# Patient Record
Sex: FEMALE | Race: WHITE | NOT HISPANIC OR LATINO
[De-identification: names, ages, dates, MRNs, and addresses within clinical notes are randomized per-mention and may not be internally consistent; named-entity substitution may affect disease eponyms.]

---

## 2022-01-07 ENCOUNTER — TRANSCRIPTION ENCOUNTER (OUTPATIENT)
Age: 42
End: 2022-01-07

## 2022-05-05 ENCOUNTER — APPOINTMENT (OUTPATIENT)
Dept: ORTHOPEDIC SURGERY | Facility: CLINIC | Age: 42
End: 2022-05-05
Payer: COMMERCIAL

## 2022-05-05 DIAGNOSIS — J45.909 UNSPECIFIED ASTHMA, UNCOMPLICATED: ICD-10-CM

## 2022-05-05 PROCEDURE — 99214 OFFICE O/P EST MOD 30 MIN: CPT | Mod: 25

## 2022-05-05 PROCEDURE — L1902: CPT

## 2022-05-05 RX ORDER — VALACYCLOVIR HYDROCHLORIDE 500 MG/1
500 TABLET, FILM COATED ORAL
Refills: 0 | Status: ACTIVE | COMMUNITY

## 2022-05-05 RX ORDER — METHYLPHENIDATE HYDROCHLORIDE 54 MG/1
54 TABLET, EXTENDED RELEASE ORAL
Refills: 0 | Status: ACTIVE | COMMUNITY

## 2022-05-05 NOTE — PHYSICAL EXAM
[NL (40)] : plantar flexion 40 degrees [NL 30)] : inversion 30 degrees [NL (20)] : eversion 20 degrees [5___] : [facility] 5[unfilled]/5 [2+] : posterior tibialis pulse: 2+ [Normal] : saphenous nerve sensation normal [] : patient ambulates without assistive device

## 2022-05-05 NOTE — PHYSICAL EXAM
[NL (40)] : plantar flexion 40 degrees [NL 30)] : inversion 30 degrees [NL (20)] : eversion 20 degrees [5___] : [facility] 5[unfilled]/5 [2+] : posterior tibialis pulse: 2+ [Normal] : saphenous nerve sensation normal [] : patient ambulates without assistive device

## 2022-05-05 NOTE — HISTORY OF PRESENT ILLNESS
[Right Leg] : right leg [Gradual] : gradual [Sudden] : sudden [8] : 8 [6] : 6 [Burning] : burning [Shooting] : shooting [Stabbing] : stabbing [Intermittent] : intermittent [Standing] : standing [Walking] : walking [Exercising] : exercising [Stairs] : stairs [Full time] : Work status: full time [FreeTextEntry3] : 4/19/22 [FreeTextEntry5] : Pt was running and felt a sudden onset of pain and swelling right after the ran one day, pt was first seen by dr cotton and was given HEP with no relief and pain got worse over time and pt went to another ortho urgent care, pt had an mri done and pt was given a boot and pt is now following up with us  [de-identified] : xrays MRIs  [de-identified] : None  [de-identified] :   [de-identified] : Pt is a 41 year old F who presents today for evaluation of their right foot. Pt states that\par Pain localized along the medial lateral dorsal plantar\par Had XR taken which showed\par Denies trauma/previous injury\par No N/T.\par Ice to affected area.\par No formal treatment to date. \par WB in\par  [] : Post Surgical Visit: no [FreeTextEntry1] : R foot

## 2022-05-05 NOTE — ASSESSMENT
[FreeTextEntry1] : She may d/c cam boot - neg for stress fx\par Airlift heel support\par She can do PT and stretching.\par Accessory navicular is not clincially evident at this time.\par \par The patient was explained the options as well as benefits of over the counter verses prescription strength nonsteroidal anti-inflammatory medication. The patient opts for a prescription strength medication.\par

## 2022-05-05 NOTE — DATA REVIEWED
[MRI] : MRI [Right] : of the right [Ankle] : ankle [I reviewed the films/CD] : I reviewed the films/CD [FreeTextEntry1] : IMPRESSION:\par (ZP 5/4/22) No stress fracture. Normal first metatarsal.\par Partially fused type II os navicularis with bone marrow edema across the central\par joint.\par \par Small amount of fluid at the flexor hallucis longus last flexor digitorum longus\par intersection which may be tenosynovitis.\par Edema and thickening of the long plantar ligament which likely represents a\par sprain.\par Diffuse soft tissue edema superficial to the quadratus plantar muscle.

## 2022-05-05 NOTE — HISTORY OF PRESENT ILLNESS
[Right Leg] : right leg [Gradual] : gradual [Sudden] : sudden [8] : 8 [6] : 6 [Burning] : burning [Shooting] : shooting [Stabbing] : stabbing [Intermittent] : intermittent [Standing] : standing [Walking] : walking [Exercising] : exercising [Stairs] : stairs [Full time] : Work status: full time [FreeTextEntry3] : 4/19/22 [FreeTextEntry5] : Pt was running and felt a sudden onset of pain and swelling right after the ran one day, pt was first seen by dr cotton and was given HEP with no relief and pain got worse over time and pt went to another ortho urgent care, pt had an mri done and pt was given a boot and pt is now following up with us  [de-identified] : xrays MRIs  [de-identified] : None  [de-identified] :   [de-identified] : Pt is a 41 year old F who presents today for evaluation of their right foot. Pt states that\par Pain localized along the medial lateral dorsal plantar\par Had XR taken which showed\par Denies trauma/previous injury\par No N/T.\par Ice to affected area.\par No formal treatment to date. \par WB in\par  [] : Post Surgical Visit: no [FreeTextEntry1] : R foot

## 2022-06-02 ENCOUNTER — APPOINTMENT (OUTPATIENT)
Dept: ORTHOPEDIC SURGERY | Facility: CLINIC | Age: 42
End: 2022-06-02

## 2022-06-08 ENCOUNTER — APPOINTMENT (OUTPATIENT)
Dept: ORTHOPEDIC SURGERY | Facility: CLINIC | Age: 42
End: 2022-06-08
Payer: COMMERCIAL

## 2022-06-08 VITALS — BODY MASS INDEX: 30.2 KG/M2 | HEIGHT: 63.5 IN | WEIGHT: 172.6 LBS

## 2022-06-08 PROCEDURE — 73630 X-RAY EXAM OF FOOT: CPT | Mod: RT

## 2022-06-08 PROCEDURE — 99214 OFFICE O/P EST MOD 30 MIN: CPT

## 2022-06-08 NOTE — REVIEW OF SYSTEMS
[Joint Pain] : joint pain [Joint Stiffness] : joint stiffness [Joint Swelling] : joint swelling [Feeling Tired] : fatigue [Muscle Weakness] : muscle weakness [Negative] : Heme/Lymph

## 2022-06-14 ENCOUNTER — NON-APPOINTMENT (OUTPATIENT)
Age: 42
End: 2022-06-14

## 2022-06-22 ENCOUNTER — LABORATORY RESULT (OUTPATIENT)
Age: 42
End: 2022-06-22

## 2022-06-27 ENCOUNTER — APPOINTMENT (OUTPATIENT)
Age: 42
End: 2022-06-27

## 2022-06-27 PROCEDURE — 29515 APPLICATION SHORT LEG SPLINT: CPT | Mod: 59,RT

## 2022-06-27 PROCEDURE — 28238 REVISION OF FOOT TENDON: CPT | Mod: RT

## 2022-06-30 NOTE — DISCUSSION/SUMMARY
[Surgical risks reviewed] : Surgical risks reviewed [de-identified] : The risks, benefits, alternatives have been discussed.  The risks include but are not limited to infection, bleeding, injury to small nerves and blood vessels, pain, stiffness, progression, dvt, PE, amputation and death.

## 2022-06-30 NOTE — HISTORY OF PRESENT ILLNESS
[Gradual] : gradual [Sudden] : sudden [3] : 3 [Burning] : burning [Radiating] : radiating [Shooting] : shooting [Tingling] : tingling [Physical therapy] : physical therapy [Standing] : standing [Walking] : walking [Stairs] : stairs [Full time] : Work status: full time [de-identified] : 5/5/22: Pt is a 41 year old F who presents today for evaluation of their right foot. Pt states that\par Pain localized along the medial lateral dorsal plantar. Was seen at  and xrays done\par \par 6/8/22: Here for a second opinion for right foot/ankle pain. Pain started 4/29/22 after running. She was treated in a cam boot for 4 days. She saw Dr. Santos and was treated in [facility] brace without relief. Pain started in her heel but is now in the inside of her ankle. She also wears orthotics. She had an mri done at [facility]. \par  [] : no [FreeTextEntry3] : 4-19-22 [FreeTextEntry5] : pt went for a jog and  had pain in ankle, different than has had in the past [FreeTextEntry7] : inner ankle [FreeTextEntry9] : PT TIW for the past month [de-identified] : running [de-identified] : Dr. Santos [de-identified] : has had xr and MRI done [de-identified] :

## 2022-06-30 NOTE — DATA REVIEWED
[MRI] : MRI [Right] : of the right [Report was reviewed and noted in the chart] : The report was reviewed and noted in the chart [FreeTextEntry1] : joao  5/3/22 No stress fracture. Normal first metatarsal.\par \par Partially fused type II os navicularis with bone marrow edema across the central\par joint.\par \par Small amount of fluid at the flexor hallucis longus last flexor digitorum longus\par intersection which may be tenosynovitis.\par \par Edema and thickening of the long plantar ligament which likely represents a\par sprain.\par \par Diffuse soft tissue edema superficial to the quadratus plantar muscle.

## 2022-06-30 NOTE — PHYSICAL EXAM
[Right] : right foot and ankle [NL (20)] : dorsiflexion 20 degrees [NL (40)] : plantar flexion 40 degrees [Positive Tinel sign at _____] : Positive Tinel sign at [unfilled] [] : no calf tenderness [de-identified] : posterior tibialis [de-identified] : sl decreaszed 1/2 toes

## 2022-06-30 NOTE — IMAGING
[Weight -] : weightbearing [There are no fractures, subluxations or dislocations. No significant abnormalities are seen] : There are no fractures, subluxations or dislocations. No significant abnormalities are seen [de-identified] : Reverse oblique:  large accessory navicula

## 2022-07-06 ENCOUNTER — APPOINTMENT (OUTPATIENT)
Dept: ORTHOPEDIC SURGERY | Facility: CLINIC | Age: 42
End: 2022-07-06
Payer: COMMERCIAL

## 2022-07-06 VITALS — BODY MASS INDEX: 30.1 KG/M2 | WEIGHT: 172 LBS | HEIGHT: 63.5 IN

## 2022-07-06 PROCEDURE — 73610 X-RAY EXAM OF ANKLE: CPT | Mod: 1L,RT

## 2022-07-06 PROCEDURE — 73630 X-RAY EXAM OF FOOT: CPT | Mod: RT

## 2022-07-06 PROCEDURE — 99024 POSTOP FOLLOW-UP VISIT: CPT

## 2022-07-06 NOTE — DISCUSSION/SUMMARY
attempt to call report, rn to call back [de-identified] : [facility] nwb\par \par Xarelto approval

## 2022-07-06 NOTE — DATA REVIEWED
[Report was reviewed and noted in the chart] : The report was reviewed and noted in the chart [FreeTextEntry1] : \par \par Partially fused type II os navicularis with bone marrow edema across the central\par joint.\par \par Small amount of fluid at the flexor hallucis longus last flexor digitorum longus\par intersection which may be tenosynovitis.\par \par Edema and thickening of the long plantar ligament which likely represents a\par sprain.\par \par Diffuse soft tissue edema superficial to the quadratus plantar muscle.

## 2022-07-06 NOTE — HISTORY OF PRESENT ILLNESS
[Gradual] : gradual [Sudden] : sudden [3] : 3 [Burning] : burning [Radiating] : radiating [Shooting] : shooting [Tingling] : tingling [Physical therapy] : physical therapy [Standing] : standing [Walking] : walking [Stairs] : stairs [Full time] : Work status: full time [de-identified] : 5/5/22: Pt is a 41 year old F who presents today for evaluation of their right foot. Pt states that\par Pain localized along the medial lateral dorsal plantar. Was seen at  and xrays done\par \par 6/8/22: Here for a second opinion for right foot/ankle pain. Pain started 4/29/22 after running. She was treated in a cam boot for 4 days. She saw Dr. Santos and was treated in [facility] brace without relief. Pain started in her heel but is now in the inside of her ankle. She also wears orthotics. She had an mri done at [facility]. \par \par 6/27/22  R Kidner procedure\par \par 7/6/22  f/u R leg, nwni in splint  Xarelto pending\par  [] : no [FreeTextEntry3] : 4-19-22 [FreeTextEntry5] : pt went for a jog and  had pain in ankle, different than has had in the past [FreeTextEntry7] : inner ankle [de-identified] : running [FreeTextEntry9] : PT TIW for the past month [de-identified] : Dr. Santos [de-identified] : has had xr and MRI done [de-identified] :

## 2022-07-06 NOTE — PHYSICAL EXAM
[Right] : right foot and ankle [NL (20)] : dorsiflexion 20 degrees [NL (40)] : plantar flexion 40 degrees [Positive Tinel sign at _____] : Positive Tinel sign at [unfilled] [] : no calf tenderness [de-identified] : posterior tibialis [de-identified] : sl decreaszed 1/2 toes

## 2022-07-06 NOTE — IMAGING
[There are no fractures, subluxations or dislocations. No significant abnormalities are seen] : There are no fractures, subluxations or dislocations. No significant abnormalities are seen [Weight -] : weightbearing [Right] : right foot [No loss of surgical correlation. Bony alignment acceptable. Hardware in appropriate position] : No loss of surgical correlation. Bony alignment acceptable. Hardware in appropriate position [de-identified] : Reverse oblique:  s/p Kidner intact dia

## 2022-07-08 NOTE — REVIEW OF SYSTEMS
[Joint Pain] : joint pain [Joint Stiffness] : joint stiffness [Joint Swelling] : joint swelling [Feeling Tired] : fatigue [Muscle Weakness] : muscle weakness [Negative] : Heme/Lymph [FreeTextEntry9] : rt foot

## 2022-07-08 NOTE — PHYSICAL EXAM
[Right] : right foot and ankle [NL (20)] : dorsiflexion 20 degrees [NL (40)] : plantar flexion 40 degrees [Positive Tinel sign at _____] : Positive Tinel sign at [unfilled] [] : no calf tenderness [de-identified] : posterior tibialis [de-identified] : sl decreaszed 1/2 toes

## 2022-07-08 NOTE — IMAGING
[Weight -] : weightbearing [There are no fractures, subluxations or dislocations. No significant abnormalities are seen] : There are no fractures, subluxations or dislocations. No significant abnormalities are seen [de-identified] : Reverse oblique:  large accessory navicula

## 2022-07-08 NOTE — HISTORY OF PRESENT ILLNESS
Will discuss with patient/ son Brennon tomorrow. Will need fax or specific pharmacy name to transfer script to.   [Gradual] : gradual [Sudden] : sudden [3] : 3 [Burning] : burning [Radiating] : radiating [Shooting] : shooting [Tingling] : tingling [Physical therapy] : physical therapy [Standing] : standing [Walking] : walking [Stairs] : stairs [Full time] : Work status: full time [de-identified] : 5/5/22: Pt is a 41 year old F who presents today for evaluation of their right foot. Pt states that\par Pain localized along the medial lateral dorsal plantar. Was seen at  and xrays done\par \par 6/8/22: Here for a second opinion for right foot/ankle pain. Pain started 4/29/22 after running. She was treated in a cam boot for 4 days. She saw Dr. Santos and was treated in [facility] brace without relief. Pain started in her heel but is now in the inside of her ankle. She also wears orthotics. She had an mri done at [facility]. \par \par 7/1/22  R Kidner procedure\par \par 7/6/22 f/u R foot  NWB in splint  Xarelto not auth [] : no [FreeTextEntry3] : 4-19-22 [FreeTextEntry5] : pt went for a jog and  had pain in ankle, different than has had in the past [FreeTextEntry7] : inner ankle [FreeTextEntry9] : PT TIW for the past month [de-identified] : running [de-identified] : Dr. Santos [de-identified] : has had xr and MRI done [de-identified] :  [de-identified] : 6 [de-identified] : rt [place]

## 2022-08-03 ENCOUNTER — APPOINTMENT (OUTPATIENT)
Dept: ORTHOPEDIC SURGERY | Facility: CLINIC | Age: 42
End: 2022-08-03

## 2022-08-03 VITALS — BODY MASS INDEX: 30.1 KG/M2 | WEIGHT: 172 LBS | HEIGHT: 63.5 IN

## 2022-08-03 DIAGNOSIS — Z86.718 PERSONAL HISTORY OF OTHER VENOUS THROMBOSIS AND EMBOLISM: ICD-10-CM

## 2022-08-03 PROCEDURE — 99024 POSTOP FOLLOW-UP VISIT: CPT

## 2022-08-03 NOTE — PHYSICAL EXAM
[Right] : right foot and ankle [Positive Tinel sign at _____] : Positive Tinel sign at [unfilled] [2+] : dorsalis pedis pulse: 2+ [] : no navicular tuberosity tenderness [de-identified] : posterior tibialis [de-identified] : improving sensation

## 2022-08-03 NOTE — HISTORY OF PRESENT ILLNESS
[Gradual] : gradual [Sudden] : sudden [4] : 4 [Burning] : burning [Localized] : localized [Shooting] : shooting [Tingling] : tingling [Physical therapy] : physical therapy [Standing] : standing [Walking] : walking [Stairs] : stairs [Full time] : Work status: full time [de-identified] : 5/5/22: Pt is a 41 year old F who presents today for evaluation of their right foot. Pt states that\par Pain localized along the medial lateral dorsal plantar. Was seen at  and xrays done\par \par 6/8/22: Here for a second opinion for right foot/ankle pain. Pain started 4/29/22 after running. She was treated in a cam boot for 4 days. She saw Dr. Santos and was treated in [facility] brace without relief. Pain started in her heel but is now in the inside of her ankle. She also wears orthotics. She had an mri done at [facility]. \par \par 6/27/22  R Kidner procedure\par \par 7/6/22  f/u R leg, nwb in splint  Xarelto pending\par 8/3/22 f/u R leg nwb in slc\par  [] : no [FreeTextEntry1] : rt foot [FreeTextEntry3] : 4-19-22 [FreeTextEntry5] : pt went for a jog and  had pain in ankle, different than has had in the past [de-identified] : running [de-identified] : Dr. Santos [de-identified] : 6-27-22 [de-identified] : has had xr and MRI done [de-identified] :  [de-identified] : from home [de-identified] : 6-27-22

## 2022-08-03 NOTE — REVIEW OF SYSTEMS
[Joint Pain] : joint pain [Joint Stiffness] : joint stiffness [Joint Swelling] : joint swelling [Muscle Weakness] : muscle weakness [Negative] : Heme/Lymph [Feeling Tired] : no fatigue [FreeTextEntry9] : feels pulling at sx site

## 2022-09-07 ENCOUNTER — APPOINTMENT (OUTPATIENT)
Dept: ORTHOPEDIC SURGERY | Facility: CLINIC | Age: 42
End: 2022-09-07

## 2022-09-07 VITALS — HEIGHT: 63.5 IN | WEIGHT: 172 LBS | BODY MASS INDEX: 30.1 KG/M2

## 2022-09-07 PROCEDURE — 73630 X-RAY EXAM OF FOOT: CPT | Mod: LT

## 2022-09-07 NOTE — REVIEW OF SYSTEMS
[Joint Pain] : joint pain [Joint Stiffness] : joint stiffness [Joint Swelling] : joint swelling [Muscle Weakness] : muscle weakness [Negative] : Heme/Lymph [Feeling Tired] : no fatigue [FreeTextEntry9] : soreness at sx site

## 2022-09-07 NOTE — HISTORY OF PRESENT ILLNESS
[Gradual] : gradual [Sudden] : sudden [2] : 2 [1] : 2 [Burning] : burning [Localized] : localized [Tingling] : tingling [Physical therapy] : physical therapy [Walking] : walking [Full time] : Work status: full time [de-identified] : 5/5/22: Pt is a 41 year old F who presents today for evaluation of their right foot. Pt states that\par Pain localized along the medial lateral dorsal plantar. Was seen at  and xrays done\par \par 6/8/22: Here for a second opinion for right foot/ankle pain. Pain started 4/29/22 after running. She was treated in a cam boot for 4 days. She saw Dr. Santos and was treated in [facility] brace without relief. Pain started in her heel but is now in the inside of her ankle. She also wears orthotics. She had an mri done at [facility]. \par \par 6/27/22  R Kidner procedure\par \par 7/6/22  f/u R leg, nwb in splint  Xarelto pending\par 8/3/22 f/u R leg nwb in slc\par 9/7/22  f/u R foot  HEP/PT   Considering L foot sugery 11/2022\par  [] : no [FreeTextEntry1] : rt foot [FreeTextEntry3] : 4-19-22 [FreeTextEntry5] : pt went for a jog and  had pain in ankle, different than has had in the past [de-identified] : 6-27-22 [de-identified] :  [de-identified] : from home [de-identified] : 6-27-22

## 2022-09-07 NOTE — PHYSICAL EXAM
[Right] : right foot and ankle [Positive Tinel sign at _____] : Positive Tinel sign at [unfilled] [2+] : dorsalis pedis pulse: 2+ [] : no navicular tuberosity tenderness [de-identified] : balance [de-identified] : improving sensation [TWNoteComboBox7] : dorsiflexion 10 degrees

## 2022-09-13 ENCOUNTER — RESULT REVIEW (OUTPATIENT)
Age: 42
End: 2022-09-13

## 2022-10-19 ENCOUNTER — APPOINTMENT (OUTPATIENT)
Dept: ORTHOPEDIC SURGERY | Facility: CLINIC | Age: 42
End: 2022-10-19

## 2022-10-19 VITALS — BODY MASS INDEX: 30.1 KG/M2 | HEIGHT: 63.5 IN | WEIGHT: 172 LBS

## 2022-10-19 PROCEDURE — 99214 OFFICE O/P EST MOD 30 MIN: CPT

## 2022-10-19 NOTE — HISTORY OF PRESENT ILLNESS
[2] : 2 [1] : 2 [Localized] : localized [Physical therapy] : physical therapy [Full time] : Work status: full time [Shooting] : shooting [Tightness] : tightness [de-identified] : 5/5/22: Pt is a 41 year old F who presents today for evaluation of their right foot. Pt states that\par Pain localized along the medial lateral dorsal plantar. Was seen at  and xrays done\par \par 6/8/22: Here for a second opinion for right foot/ankle pain. Pain started 4/29/22 after running. She was treated in a cam boot for 4 days. She saw Dr. Santos and was treated in [facility] brace without relief. Pain started in her heel but is now in the inside of her ankle. She also wears orthotics. She had an mri done at [facility]. \par \par 6/27/22  R Kidner procedure\par \par 7/6/22  f/u R leg, nwb in splint  Xarelto pending\par 8/3/22 f/u R leg nwb in slc\par 9/7/22  f/u R foot  HEP/PT   Considering L foot sugery 11/2022\par 10/19/22  Pre-op L leg\par  [] : Post Surgical Visit: no [FreeTextEntry1] : rt foot [FreeTextEntry5] : worse since COVID/Flu vaccine 10-14-22 [de-identified] : 6-27-22 [de-identified] :  [de-identified] : from home [de-identified] : 6-27-22

## 2022-10-19 NOTE — PHYSICAL EXAM
[Right] : right foot and ankle [Positive Tinel sign at _____] : Positive Tinel sign at [unfilled] [2+] : dorsalis pedis pulse: 2+ [] : no navicular tuberosity tenderness [de-identified] : balance [de-identified] : improving sensation [TWNoteComboBox7] : dorsiflexion 10 degrees

## 2022-10-19 NOTE — DISCUSSION/SUMMARY
[Surgical risks reviewed] : Surgical risks reviewed [de-identified] : The risks, benefits, alternatives have been discussed.  The risks include but are not limited to infection, bleeding, injury to small nerves and blood vessels, pain, stiffness, progression, dvt, PE, amputation and death.\par \par

## 2022-10-19 NOTE — DATA REVIEWED
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  Type II accessory navicular bone.  Degenerative changes at the tibiotalar joint.

## 2022-10-26 ENCOUNTER — FORM ENCOUNTER (OUTPATIENT)
Age: 42
End: 2022-10-26

## 2022-10-31 ENCOUNTER — LABORATORY RESULT (OUTPATIENT)
Age: 42
End: 2022-10-31

## 2022-11-04 ENCOUNTER — APPOINTMENT (OUTPATIENT)
Age: 42
End: 2022-11-04

## 2022-11-04 PROCEDURE — 28238 REVISION OF FOOT TENDON: CPT | Mod: LT

## 2022-11-04 PROCEDURE — 29515 APPLICATION SHORT LEG SPLINT: CPT | Mod: 59,LT

## 2022-11-09 ENCOUNTER — APPOINTMENT (OUTPATIENT)
Dept: ORTHOPEDIC SURGERY | Facility: CLINIC | Age: 42
End: 2022-11-09
Payer: COMMERCIAL

## 2022-11-09 VITALS — BODY MASS INDEX: 30.1 KG/M2 | WEIGHT: 172 LBS | HEIGHT: 63.5 IN

## 2022-11-09 PROCEDURE — 29405 APPL SHORT LEG CAST: CPT | Mod: 58

## 2022-11-09 PROCEDURE — 99024 POSTOP FOLLOW-UP VISIT: CPT

## 2022-11-09 PROCEDURE — 73630 X-RAY EXAM OF FOOT: CPT | Mod: LT

## 2022-11-09 NOTE — PHYSICAL EXAM
[2+] : dorsalis pedis pulse: 2+ [Left] : left foot and ankle [] : no calf tenderness [TWNoteComboBox7] : dorsiflexion 10 degrees

## 2022-11-09 NOTE — HISTORY OF PRESENT ILLNESS
[6] : 6 [5] : 5 [Localized] : localized [Throbbing] : throbbing [Tightness] : tightness [Frequent] : frequent [Full time] : Work status: full time [de-identified] : 5/5/22: Pt is a 41 year old F who presents today for evaluation of their right foot. Pt states that\par Pain localized along the medial lateral dorsal plantar. Was seen at  and xrays done\par \par 6/8/22: Here for a second opinion for right foot/ankle pain. Pain started 4/29/22 after running. She was treated in a cam boot for 4 days. She saw Dr. Santos and was treated in [facility] brace without relief. Pain started in her heel but is now in the inside of her ankle. She also wears orthotics. She had an mri done at [facility]. \par \par 6/27/22  R Kidner procedure\par \par 7/6/22  f/u R leg, nwb in splint  Xarelto pending\par 8/3/22 f/u R leg nwb in slc\par 9/7/22  f/u R foot  HEP/PT   Considering L foot sugery 11/2022\par 10/19/22  Pre-op L leg\par \par 11/4/22L Kidner\par \par 11/9/22 f/u L leg, nwb in splint  Xarelto\par \par  [] : Post Surgical Visit: no [FreeTextEntry1] : left foot [FreeTextEntry6] : itchy, bruising, spasms [de-identified] : elevation at times [de-identified] : 11-4-22 [de-identified] :  [de-identified] : from home [de-identified] : 11-4-22

## 2022-11-09 NOTE — REVIEW OF SYSTEMS
[Joint Pain] : joint pain [Muscle Weakness] : muscle weakness [Negative] : Heme/Lymph [Joint Stiffness] : no joint stiffness [Joint Swelling] : no joint swelling [Feeling Tired] : no fatigue

## 2022-11-09 NOTE — DISCUSSION/SUMMARY
[de-identified] : A well padded cast was applied.  Patient was instructed on proper cast management including keeping it dry and not sticking anything down the cast.  Patient was told that if pain significantly increases or toes turn numb and/or blue and simple elevation does not allow symptoms to improve in a few minutes, to call the office immediately or go to the ER.  All questions were answered.\par \par \par Cont Xarelto\par \par

## 2022-11-16 ENCOUNTER — APPOINTMENT (OUTPATIENT)
Dept: ORTHOPEDIC SURGERY | Facility: CLINIC | Age: 42
End: 2022-11-16

## 2022-12-14 ENCOUNTER — APPOINTMENT (OUTPATIENT)
Dept: ORTHOPEDIC SURGERY | Facility: CLINIC | Age: 42
End: 2022-12-14

## 2022-12-14 PROCEDURE — 99024 POSTOP FOLLOW-UP VISIT: CPT

## 2022-12-14 NOTE — HISTORY OF PRESENT ILLNESS
[5] : 5 [Throbbing] : throbbing [Tightness] : tightness [Full time] : Work status: full time [2] : 2 [Radiating] : radiating [Occasional] : occasional [Standing] : standing [Walking] : walking [de-identified] : 5/5/22: Pt is a 41 year old F who presents today for evaluation of their right foot. Pt states that\par Pain localized along the medial lateral dorsal plantar. Was seen at  and xrays done\par \par 6/8/22: Here for a second opinion for right foot/ankle pain. Pain started 4/29/22 after running. She was treated in a cam boot for 4 days. She saw Dr. Santos and was treated in [facility] brace without relief. Pain started in her heel but is now in the inside of her ankle. She also wears orthotics. She had an mri done at [facility]. \par \par 6/27/22  R Kidner procedure\par \par 7/6/22  f/u R leg, nwb in splint  Xarelto pending\par 8/3/22 f/u R leg nwb in slc\par 9/7/22  f/u R foot  HEP/PT   Considering L foot sugery 11/2022\par 10/19/22  Pre-op L leg\par \par 11/4/22L Kidner\par \par 11/9/22 f/u L leg, nwb in splint  Xarelto\par 12/14/22  f/u L leg nwb in slc\par \par  [] : no [FreeTextEntry1] : left foot [FreeTextEntry6] : Opposite foot has developed pain due to over favoring it  [FreeTextEntry7] : foot to the knee [FreeTextEntry9] : Elevation [de-identified] : 11-4-22 [de-identified] :  [de-identified] : from home [de-identified] : 11-4-22

## 2022-12-14 NOTE — PHYSICAL EXAM
[Left] : left foot and ankle [2+] : dorsalis pedis pulse: 2+ [] : no calf tenderness [TWNoteComboBox7] : dorsiflexion 10 degrees

## 2022-12-19 ENCOUNTER — NON-APPOINTMENT (OUTPATIENT)
Age: 42
End: 2022-12-19

## 2023-01-11 ENCOUNTER — APPOINTMENT (OUTPATIENT)
Dept: ORTHOPEDIC SURGERY | Facility: CLINIC | Age: 43
End: 2023-01-11
Payer: COMMERCIAL

## 2023-01-11 VITALS — WEIGHT: 172 LBS | BODY MASS INDEX: 30.1 KG/M2 | HEIGHT: 63.5 IN

## 2023-01-11 PROCEDURE — 99024 POSTOP FOLLOW-UP VISIT: CPT

## 2023-01-11 NOTE — DISCUSSION/SUMMARY
[de-identified] : d/c bbbot\par Shoe/insoles\par PT [facility]\par \par MRI to evaluate plantar fascia\par \par

## 2023-01-11 NOTE — REVIEW OF SYSTEMS
[Joint Pain] : joint pain [Muscle Weakness] : muscle weakness [Negative] : Heme/Lymph [Joint Stiffness] : joint stiffness [Joint Swelling] : no joint swelling [Feeling Tired] : no fatigue [FreeTextEntry9] : rt toe stiffness and left ankle

## 2023-01-11 NOTE — HISTORY OF PRESENT ILLNESS
[Radiating] : radiating [Occasional] : occasional [Standing] : standing [Walking] : walking [Full time] : Work status: full time [4] : 4 [0] : 0 [Shooting] : shooting [Leisure] : leisure [Physical therapy] : physical therapy [de-identified] : 5/5/22: Pt is a 41 year old F who presents today for evaluation of their right foot. Pt states that\par Pain localized along the medial lateral dorsal plantar. Was seen at  and xrays done\par \par 6/8/22: Here for a second opinion for right foot/ankle pain. Pain started 4/29/22 after running. She was treated in a cam boot for 4 days. She saw Dr. Santos and was treated in [facility] brace without relief. Pain started in her heel but is now in the inside of her ankle. She also wears orthotics. She had an mri done at [facility]. \par \par 6/27/22  R Kidner procedure\par \par 7/6/22  f/u R leg, nwb in splint  Xarelto pending\par 8/3/22 f/u R leg nwb in slc\par 9/7/22  f/u R foot  HEP/PT   Considering L foot sugery 11/2022\par 10/19/22  Pre-op L leg\par \par 11/4/22L Kidner\par \par 11/9/22 f/u L leg, nwb in splint  Xarelto\par 12/14/22  f/u L leg nwb in slc\par 1/11/23 f/u L foot.  WB in cast w/arch support  Started PT  Concerned about plantar fascia\par  [] : no [FreeTextEntry1] : left foot [FreeTextEntry7] : foot to the knee [FreeTextEntry9] : Elevation [de-identified] : 11-4-22 [de-identified] :  [de-identified] : from home [de-identified] : 11-4-22

## 2023-01-23 ENCOUNTER — APPOINTMENT (OUTPATIENT)
Dept: CARDIOLOGY | Facility: CLINIC | Age: 43
End: 2023-01-23
Payer: COMMERCIAL

## 2023-01-23 ENCOUNTER — NON-APPOINTMENT (OUTPATIENT)
Age: 43
End: 2023-01-23

## 2023-01-23 VITALS
HEART RATE: 87 BPM | OXYGEN SATURATION: 97 % | HEIGHT: 63.5 IN | DIASTOLIC BLOOD PRESSURE: 88 MMHG | SYSTOLIC BLOOD PRESSURE: 130 MMHG | BODY MASS INDEX: 30.8 KG/M2 | WEIGHT: 176 LBS

## 2023-01-23 DIAGNOSIS — Z78.9 OTHER SPECIFIED HEALTH STATUS: ICD-10-CM

## 2023-01-23 DIAGNOSIS — Z82.49 FAMILY HISTORY OF ISCHEMIC HEART DISEASE AND OTHER DISEASES OF THE CIRCULATORY SYSTEM: ICD-10-CM

## 2023-01-23 PROCEDURE — 93000 ELECTROCARDIOGRAM COMPLETE: CPT

## 2023-01-23 PROCEDURE — 99204 OFFICE O/P NEW MOD 45 MIN: CPT | Mod: 25

## 2023-01-23 NOTE — PHYSICAL EXAM
[Normal] : normocephalic [] : Yes [Total Score ___] : Total Score = [unfilled] [Tremor] : no tremor [de-identified] : thin skin, hypermobile skin, thin scars , velvet skin  [Right] : Right: N [Left] : Left: N

## 2023-01-23 NOTE — HISTORY OF PRESENT ILLNESS
[FreeTextEntry1] : ROBERTO CARLOS CARCAMO is a 43 yo F PMH autoimmune disorder, arthritis, hx of DVT\par there is a concern for EDS \par  she underwent genetic testing with the [employer] test, that was negative\par \par she has had a clinical eval for EDS prior \par today she is referred for a cardiogenomic evaluation

## 2023-01-23 NOTE — REASON FOR VISIT
[FreeTextEntry3] : Dear Dr. Retana       . I saw your patient ROBERTO CARLOS CARCAMO on 01/23/2023 . Please see the note below for the assessment and plan. \par \par ROBERTO CARLOS CARCAMO  was seen  for an initial consultation at the Cardiogenomics Program at [facility] on 01/23/2023.   Ms. CARCAMO was referred by  Dr Retana for hereditary cardiac predisposition risk assessment and counseling, due to concern for EDS\par \par \par

## 2023-01-23 NOTE — FAMILY HISTORY
[FreeTextEntry1] : daughter has a cliical diagnosis of EDS \par FamilyHistory_20_twCiteListControlStart FamilyHistory_20_twCiteListControlEnd [id number] \par A four-generation family history was constructed and scanned into [email or web address]. \par Family history is significant for: \par siblings\par  43 yo sister hx afib: no children\par \par 34 yo half brother shared mother hx Aspbergers: infant daughter\par 31 yo half sister shared mother hx anxiety, asthma: no children due to fibroids, webbed toes, sensitive skin \par \par Mother 64 yo hx hypersensitivity, arthritis, hypermobile younger, varicose veins, hx obesity\par Maternal aunts x2 \par 68 yo gerdm keg pain debilitating\par Maternal uncles x2 \par uncle dec suicide , gout, crooked pinkie fingers, hyperhydrosis , depression\par  71 yo uncle hip replacement due to RA, CVA at birth\par Maternal Grandmother  dec 90yo  hx afib and htn , DM, arthritis , varicose veins, hypothyroid, AD\par Maternal Grandfather hx AAA dec 88 yo , renal failure CVA in 50-60s, atherosclerosis\par mat gr uncle hx AAA dec 91 yo \par  42 yo cousin hx AAA, from aunt , hx valve defect and gout\par \par Father 64 yo hx heart issues, depression , gi issues, "hole in heart at birth"\par Paternal aunts x2\par aunt dec 50s heart failure\par aunt 62 yo heart prob, vasc prob, afib, GI prob, HTN\par Paternal uncles x1 dec MI \par Paternal Grandfather hx heart prob, hx poilio as child dec\par Paternal Grandmother dec 56 yo BR CA\par \par children:\par 15 yo daughter, clinical diagnosis hypermobile EDS\par \par her maternal families originate from [place] and paternal families originate from [place], [place], [place]. \par No Ashkenazi Voodoo ancestry. \par Family history was positive/ negative for consanguinity  \par No family history of SIDS\par  \par \par  [FreeTextEntry2] : [place]  [FreeTextEntry3] : [place], [place], [place].

## 2023-01-24 ENCOUNTER — RESULT REVIEW (OUTPATIENT)
Age: 43
End: 2023-01-24

## 2023-01-25 ENCOUNTER — TRANSCRIPTION ENCOUNTER (OUTPATIENT)
Age: 43
End: 2023-01-25

## 2023-02-01 ENCOUNTER — APPOINTMENT (OUTPATIENT)
Dept: ORTHOPEDIC SURGERY | Facility: CLINIC | Age: 43
End: 2023-02-01
Payer: COMMERCIAL

## 2023-02-01 VITALS — HEIGHT: 63.5 IN | WEIGHT: 176 LBS | BODY MASS INDEX: 30.8 KG/M2

## 2023-02-01 PROCEDURE — 99024 POSTOP FOLLOW-UP VISIT: CPT

## 2023-02-01 NOTE — DATA REVIEWED
[MRI] : MRI [Left] : left [I independently reviewed and interpreted images and report] : I independently reviewed and interpreted images and report [FreeTextEntry1] : p-surgical changes

## 2023-02-01 NOTE — HISTORY OF PRESENT ILLNESS
[8] : 8 [1] : 2 [Radiating] : radiating [Shooting] : shooting [Occasional] : occasional [Leisure] : leisure [Physical therapy] : physical therapy [Standing] : standing [Walking] : walking [Full time] : Work status: full time [de-identified] : 5/5/22: Pt is a 41 year old F who presents today for evaluation of their right foot. Pt states that\par Pain localized along the medial lateral dorsal plantar. Was seen at  and xrays done\par \par 6/8/22: Here for a second opinion for right foot/ankle pain. Pain started 4/29/22 after running. She was treated in a cam boot for 4 days. She saw Dr. Santos and was treated in [facility] brace without relief. Pain started in her heel but is now in the inside of her ankle. She also wears orthotics. She had an mri done at [facility]. \par \par 6/27/22  R Kidner procedure\par \par 7/6/22  f/u R leg, nwb in splint  Xarelto pending\par 8/3/22 f/u R leg nwb in slc\par 9/7/22  f/u R foot  HEP/PT   Considering L foot sugery 11/2022\par 10/19/22  Pre-op L leg\par \par 11/4/22L Kidner\par \par 11/9/22 f/u L leg, nwb in splint  Xarelto\par 12/14/22  f/u L leg nwb in slc\par 1/11/23 f/u L foot.  WB in cast w/arch support  Started PT  Concerned about plantar fascia\par 2/1/23 f/u L foot  random pain and swelling and disloration  Improves with elevation.  Also w/ 1 mtp pain [] : no [FreeTextEntry1] : left foot [FreeTextEntry7] : foot to the knee [FreeTextEntry9] : Elevation [de-identified] : 11-4-22 [de-identified] : MRI  Aggie [de-identified] : back in short cam boot [de-identified] :  [de-identified] : from home [de-identified] : 11-4-22

## 2023-02-01 NOTE — REVIEW OF SYSTEMS
[Joint Pain] : joint pain [Joint Stiffness] : joint stiffness [Joint Swelling] : joint swelling [Muscle Weakness] : muscle weakness [Negative] : Heme/Lymph [Feeling Tired] : no fatigue

## 2023-02-03 ENCOUNTER — APPOINTMENT (OUTPATIENT)
Dept: PAIN MANAGEMENT | Facility: CLINIC | Age: 43
End: 2023-02-03
Payer: COMMERCIAL

## 2023-02-03 VITALS — BODY MASS INDEX: 31.18 KG/M2 | HEIGHT: 63 IN | WEIGHT: 176 LBS

## 2023-02-03 DIAGNOSIS — Z86.59 PERSONAL HISTORY OF OTHER MENTAL AND BEHAVIORAL DISORDERS: ICD-10-CM

## 2023-02-03 DIAGNOSIS — F43.10 POST-TRAUMATIC STRESS DISORDER, UNSPECIFIED: ICD-10-CM

## 2023-02-03 DIAGNOSIS — F41.1 GENERALIZED ANXIETY DISORDER: ICD-10-CM

## 2023-02-03 PROCEDURE — 99204 OFFICE O/P NEW MOD 45 MIN: CPT

## 2023-02-03 RX ORDER — ACETAMINOPHEN AND CODEINE 300; 30 MG/1; MG/1
300-30 TABLET ORAL
Qty: 20 | Refills: 0 | Status: DISCONTINUED | COMMUNITY
Start: 2022-06-23 | End: 2023-02-03

## 2023-02-03 RX ORDER — OXYCODONE 5 MG/1
5 TABLET ORAL
Qty: 20 | Refills: 0 | Status: DISCONTINUED | COMMUNITY
Start: 2022-06-28 | End: 2023-02-03

## 2023-02-03 NOTE — PHYSICAL EXAM
[de-identified] : General:  Awake and alert in no acute distress\par Psych: normal mood and affect\par HEENT: NC/AT, normal visual tracking\par Pulmonary: no increased work of breathing\par CV: extremities are warm and perfused\par Abd: non-distended\par Ext: no c/c/e\par \par Inspection: Non-antalgic gait.  Both feet with slight discoloration but symmetric.  Slight edema of dorsum of left foot.  No apparent temperature discrepancies between feet.  No trophic changes (hair growth symmetric; no skin changes)\par \par Palpation: no apparent allodynia or hyperesthesia upon palpation of Left foot\par \par ROM: Right ankle with full a/p ROM; left ankle with full a/p ROM \par \par Motor Strength: \par DF (R)/(L): 5/5\par PF (R)/(L): 5/5\par EHL(R)/(L): 5/5\par KF  (R)/(L): 5/5\par KE  (R)/(L): 5/5\par \par Sensation: intact b/l to light touch\par \par Reflexes: 2+/4 at bilateral Patellar (L2-4) and Achilles (S1).\par

## 2023-02-03 NOTE — HISTORY OF PRESENT ILLNESS
[8] : 8 [3] : 3 [Dull/Aching] : dull/aching [Sharp] : sharp [Shooting] : shooting [Intermittent] : intermittent [Household chores] : household chores [Physical therapy] : physical therapy [Nothing helps with pain getting better] : Nothing helps with pain getting better [Standing] : standing [Walking] : walking [] : no [FreeTextEntry1] : LT FOOT  [de-identified] : X RAY

## 2023-02-03 NOTE — ASSESSMENT
[FreeTextEntry1] : This is ROBERTO CARLOS CARCAMO,  a 42 year-old female here for left foot pain with impairment in ADLs and functionality.  The pain has not responded to conservative care including NSAID therapy and/or physical therapy.  There is no bleeding tendency, unstable medical condition, or systemic infection.\par \par Based on history and physical, I suspect there are likely multiple pain generators.  While the patient does not fulfill the Budepest Criteria for CRPS, she does endorse some symptoms.  I am less convinced of CRPS, however, given that her symptoms seem to be widespread (hands, shoulders).  I suspect that most of her pain is caused from normal post-surgical changes, prolonged use of boot, and underlying EDS.  \par \par I discussed the above at length with the patient, including prognosis, complications, and red flag symptoms.  We discussed a graded and multidisciplinary treatment plan, including physical therapy/HEP, medications, and/or interventional procedures.  The risks and benefits of each of these were addressed and all questions answered to the patient's apparent satisfaction.  After this discussion, the following plan was developed with the patient: \par \par PT: \par -Emphasized importance of PT/HEP as a mainstay of treatment. \par -instructed on how to provide gradually increasing sensory stimulation\par \par Medication(s):\par -start pregabalin 50mg qD and uptitrate to TID as tolerated. Instructions provided.  \par -recommend discussion with therapist regarding switching from prozac to cymbalta for improved control of diffuse MSK complaints\par \par Imaging/Labs: \par -reviewed as above\par \par Procedure(s): \par -patient notes she has had very negative reactions to peripheral injections of lidocaine/steroid in the past, though she seems to tolerate PO steroid well.  At this time, would defer LSB given prior reactions and inconclusive symptoms. \par \par Follow-Up: \par -6 weeks\par \par Referrals: \par -encouraged dedicated pain psychologist as well as continuing to work on anxiety with current therapist, as patient clearly lives with high levels of anxiety surrounding her health.  \par \par \par The risks, benefits and alternatives of the proposed procedure were explained in detail with the patient. The risks outlined include but are not limited to infection, bleeding, post- dural puncture headache (for GENESIS), nerve injury, a temporary increase in pain, failure to resolve symptoms, allergic reaction, and possible elevation of blood sugar in diabetics if using corticosteroid.  All questions were answered to patient's apparent satisfaction and he/she verbalized an understanding.\par \par \par The patient was advised to consult with their primary medical provider prior to initiation of any new medications to reduce the risk of adverse effects specific to their long-term home medications and medical history.  The risk of gastrointestinal irritation and kidney injury specific to long-term NSAID use was discussed.  When appropriate, iSTOP/ has been checked and any aberrations discussed with patient.  \par \par

## 2023-02-03 NOTE — PHYSICAL EXAM
Progress Note    Admit Date: 9/6/2022  Day: 10  Diet: ADULT DIET; Regular; 3 carb choices (45 gm/meal); Low Fat/Low Chol/High Fiber/DONATO    CC: AMS    Interval history:    No acute events overnight. Nursing and consult notes reviewed. Patient was seen and examined by our team this morning at bed side. Peritoneal dialysis was competed last night. BUN remains elevated at 29 from 25 the day prior. Overall, patient remained stable from yesterday with improvement with his breathing. No cough was noted this morning. Mr. Chandan Anderson continues to feel down and \"would just like things to be over\". He reports poor sleep and denies SI/HI. Pre-certification discharge to  remains pending. Denies any chest pain, shortness of breath, nausea, vomiting, abdominal pain or palpitations.        Medications:     Scheduled Meds:   [Held by provider] buPROPion  100 mg Oral Daily    insulin glargine  35 Units SubCUTAneous Nightly    insulin lispro  0-4 Units SubCUTAneous TID WC    insulin lispro  0-4 Units SubCUTAneous Nightly    [Held by provider] insulin lispro  5 Units SubCUTAneous TID WC    famotidine  20 mg Oral Daily    polyethylene glycol  17 g Oral BID    epoetin daphney-epbx  2,000 Units SubCUTAneous Once per day on Mon Wed Fri    atorvastatin  80 mg Oral Nightly    levothyroxine  50 mcg Oral Daily    aspirin  81 mg Oral Daily    sodium chloride flush  5-40 mL IntraVENous 2 times per day    heparin (porcine)  5,000 Units SubCUTAneous 3 times per day     Continuous Infusions:   sodium chloride 25 mL (09/10/22 1132)    dextrose       PRN Meds:gentamicin, acetaminophen **OR** acetaminophen, sodium chloride flush, sodium chloride, ondansetron **OR** ondansetron, polyethylene glycol, glucose, dextrose bolus **OR** dextrose bolus, glucagon (rDNA), dextrose    Objective:   Vitals:   T-max:  Patient Vitals for the past 8 hrs:   BP Temp Temp src Pulse Resp SpO2 Weight   09/16/22 0909 124/73 98.1 °F (36.7 °C) Oral 80 18 95 % -- 09/16/22 0545 (!) 147/67 98.7 °F (37.1 °C) Axillary 91 18 -- 212 lb 11.9 oz (96.5 kg)   09/16/22 0349 120/73 98 °F (36.7 °C) Axillary 90 20 96 % --       Intake/Output Summary (Last 24 hours) at 9/16/2022 1121  Last data filed at 9/16/2022 0545  Gross per 24 hour   Intake 840 ml   Output 89 ml   Net 751 ml       Review of Systems   Constitutional:  Positive for fatigue. HENT: Negative. Eyes: Negative. Respiratory:  Negative for cough and shortness of breath. Cardiovascular: Negative. Gastrointestinal:  Positive for abdominal distention. Endocrine: Negative. Genitourinary: Negative. Musculoskeletal: Negative. Skin: Negative. Allergic/Immunologic: Negative. Neurological: Negative. Hematological: Negative. Psychiatric/Behavioral: Negative. Physical Exam  Constitutional:       Appearance: He is obese. He is ill-appearing. Cardiovascular:      Rate and Rhythm: Normal rate and regular rhythm. Pulmonary:      Effort: Pulmonary effort is normal.      Breath sounds: Normal breath sounds. Abdominal:      General: Bowel sounds are normal. There is distension. Skin:     General: Skin is warm and dry. Neurological:      General: No focal deficit present. Mental Status: He is alert and oriented to person, place, and time. Mental status is at baseline.    Psychiatric:      Comments: + Depressed affect       LABS:    CBC:   Recent Labs     09/14/22  0430 09/15/22  0434 09/16/22  0426   WBC 10.5 9.7 9.8   HGB 9.3* 9.2* 10.1*   HCT 27.6* 27.2* 29.2*    252 274   .3* 114.9* 113.7*     Renal:    Recent Labs     09/14/22  0430 09/15/22  0434 09/16/22  0426    136 133*   K 4.6 4.6 4.2    96* 92*   CO2 28 27 28   BUN 12 25* 29*   CREATININE 3.1* 5.0* 5.3*   GLUCOSE 109* 172* 343*   CALCIUM 8.7 8.6 8.7   MG 2.30 2.30 2.40   ANIONGAP 10 13 13     Hepatic: No results for input(s): AST, ALT, BILITOT, BILIDIR, PROT, LABALBU, ALKPHOS in the last 72 hours.  Troponin: No results for input(s): TROPONINI in the last 72 hours. BNP: No results for input(s): BNP in the last 72 hours. Lipids: No results for input(s): CHOL, HDL in the last 72 hours. Invalid input(s): LDLCALCU, TRIGLYCERIDE  ABGs:  No results for input(s): PHART, BHL5RCD, PO2ART, BDO2KHJ, BEART, THGBART, X4CLHYTU, IRA1LQA in the last 72 hours. INR: No results for input(s): INR in the last 72 hours. Lactate: No results for input(s): LACTATE in the last 72 hours. Cultures:  -----------------------------------------------------------------  RAD:   XR CHEST PORTABLE   Final Result   Impression: Interval placement of a right internal jugular approach dialysis catheter, as above. CT HEAD WO CONTRAST   Final Result   1. No evidence for acute intracranial process. No bleed or shift   2. Remote infarct left thalamus      XR CHEST (2 VW)   Final Result      Free air beneath the right hemidiaphragm. Bibasilar atelectasis. Critical result discussed with Dr. Gideon Dooley at 1:00 PM 9/6/2022          Assessment/Plan:   67 y.o. male with pertinent medical history of ESRD on PD, interstitial lung disease, diastolic HF (3/70 Echo with EF 50-55%), NSTEMI, CAD s/p CABG 2007, T2DM, HTN, HLD, hypothyroidism who was admitted on 9/6/2022 for management of AMS secondary to acute encephalopathy. Suspected major depressive disorder  - Patient reports a \"down mood\" for a period longer than one month. He has poor sleep, reduced appetite, irritability, loss of interest, and concentration difficulties. - Start bupropion 100 mg every other day PO  - Pharmacy who recommended the dose above due to the patinet's kidney failure and PD regimen  - Bupropion is renally excreted and CKD may allow active metabolizes of the drug to stack.  Be on the look out for bupropion overdose (Coma, hypotension, seizures, QRS widening, and ventriclar dysrhythmias.)     ESRD on PD   - PD completed yesterday, patient tolerated well(9/15)  - BUN increased from 25 to 29 3 from yesterday     T2DM, uncontrolled   - Patient's appetite has returned since 9/15 and his blood sugars serum values have increased to 259, 345, 338 from a baseline between 140-180 mg%. -Increase basal insulin from 30 IU to 35 IU and reassess  - POC glucose   - LDSSI  - Hypoglycemia protocol      Hypomagnesemia - Resolved  - Mg2+ 1.9 (9/7), 2.3 (9/14)  - Ordered Mag Sulfate 2,000 mg IV bolus (x1) on 9/7     Shortness of breath - Resolved  - Patient has bilateral diffuse rales with shortness of breath on physical exam. Non-productive intermittent wet cough also present. There has been no fever in the past 48 hours. - We will reassesses patient. If symptoms remain or worsen after PD, we will consider blood gasses for possible acidosis. - Symptoms may be due to atelectasis, consider repeat imaging.  - SOB resolved after peritoneal dialysis on 9/15             Chronic medical problems  Hypothyroidism - Continue synthroid 50 mcg  CAD s/p CABG 2007 - Continue aspirin 81 mg daily and lipitor 80 mg nightly      Code Status: Full code   FEN: ADULT DIET; Regular; 3 carb choices (45 gm/meal); Low Fat/Low Chol/High Fiber/DONATO  PPX: Heparin   DISPO: IP, ready for discharge. Santino Short, MS4  09/16/22  11:21 AM    I have seen and examined this patient and I agree with the documentation of the history, physical examination, assessment and plan. Leticia Stovall MD  PGY-1    This patient has been staffed and discussed with Krystal Tay MD.      Addendum to Resident H& P/Progress note:  I have personally seen,examined and evaluated the patient. I have reviewed the current history, physical findings, labs and assessment and plan and agree with note as documented by resident MD ( ) and MS 4, Lucie. Awaiting for pre-cert from [address] [place] [address] for SNF placement.     Krystal Tay MD, FACP [de-identified] : General:  Awake and alert in no acute distress\par Psych: normal mood and affect\par HEENT: NC/AT, normal visual tracking\par Pulmonary: no increased work of breathing\par CV: extremities are warm and perfused\par Abd: non-distended\par Ext: no c/c/e\par \par Inspection: Non-antalgic gait.  Both feet with slight discoloration but symmetric.  Slight edema of dorsum of left foot.  No apparent temperature discrepancies between feet.  No trophic changes (hair growth symmetric; no skin changes)\par \par Palpation: no apparent allodynia or hyperesthesia upon palpation of Left foot\par \par ROM: Right ankle with full a/p ROM; left ankle with full a/p ROM \par \par Motor Strength: \par DF (R)/(L): 5/5\par PF (R)/(L): 5/5\par EHL(R)/(L): 5/5\par KF  (R)/(L): 5/5\par KE  (R)/(L): 5/5\par \par Sensation: intact b/l to light touch\par \par Reflexes: 2+/4 at bilateral Patellar (L2-4) and Achilles (S1).\par

## 2023-02-03 NOTE — HISTORY OF PRESENT ILLNESS
[8] : 8 [3] : 3 [Dull/Aching] : dull/aching [Sharp] : sharp [Shooting] : shooting [Intermittent] : intermittent [Household chores] : household chores [Physical therapy] : physical therapy [Nothing helps with pain getting better] : Nothing helps with pain getting better [Standing] : standing [Walking] : walking [] : no [FreeTextEntry1] : LT FOOT  [de-identified] : X RAY

## 2023-02-16 ENCOUNTER — TRANSCRIPTION ENCOUNTER (OUTPATIENT)
Age: 43
End: 2023-02-16

## 2023-02-22 ENCOUNTER — APPOINTMENT (OUTPATIENT)
Dept: ORTHOPEDIC SURGERY | Facility: CLINIC | Age: 43
End: 2023-02-22
Payer: COMMERCIAL

## 2023-02-22 VITALS — HEIGHT: 63 IN | WEIGHT: 176 LBS | BODY MASS INDEX: 31.18 KG/M2

## 2023-02-22 PROCEDURE — 99213 OFFICE O/P EST LOW 20 MIN: CPT

## 2023-02-22 NOTE — PHYSICAL EXAM
No pertinent past medical history [Left] : left foot and ankle [2+] : dorsalis pedis pulse: 2+ [] : no calf tenderness [TWNoteComboBox7] : dorsiflexion 10 degrees

## 2023-02-22 NOTE — REVIEW OF SYSTEMS
[Joint Pain] : joint pain [Joint Stiffness] : joint stiffness [Joint Swelling] : joint swelling [Muscle Weakness] : muscle weakness [Negative] : Heme/Lymph [Feeling Tired] : no fatigue [FreeTextEntry9] : toe joint stiffness, ankle swelling

## 2023-02-22 NOTE — HISTORY OF PRESENT ILLNESS
[1] : 2 [0] : 0 [Radiating] : radiating [Occasional] : occasional [Leisure] : leisure [Meds] : meds [Physical therapy] : physical therapy [Standing] : standing [Walking] : walking [Full time] : Work status: full time [de-identified] : 5/5/22: Pt is a 41 year old F who presents today for evaluation of their right foot. Pt states that\par Pain localized along the medial lateral dorsal plantar. Was seen at  and xrays done\par \par 6/8/22: Here for a second opinion for right foot/ankle pain. Pain started 4/29/22 after running. She was treated in a cam boot for 4 days. She saw Dr. Santos and was treated in [facility] brace without relief. Pain started in her heel but is now in the inside of her ankle. She also wears orthotics. She had an mri done at [facility]. \par \par 6/27/22  R Kidner procedure\par \par 7/6/22  f/u R leg, nwb in splint  Xarelto pending\par 8/3/22 f/u R leg nwb in slc\par 9/7/22  f/u R foot  HEP/PT   Considering L foot sugery 11/2022\par 10/19/22  Pre-op L leg\par \par 11/4/22 L Kidner\par \par 11/9/22 f/u L leg, nwb in splint  Xarelto\par 12/14/22  f/u L leg nwb in slc\par 1/11/23 f/u L foot.  WB in cast w/arch support  Started PT  Concerned about plantar fascia\par 2/1/23 f/u L foot  random pain and swelling and disloration  Improves with elevation.  Also w/ 1 mtp pain\par 2/22/23: f/u L foot. comfortable in sneakers. PT helpful . Seeing pain management. Recently started on Lyrica with some relief. Swelling/color changes better w/compression stockinjgs [] : Post Surgical Visit: no [FreeTextEntry1] : left foot [FreeTextEntry6] : pain near incision [FreeTextEntry7] : toes/foot to the knee "coldness" [FreeTextEntry9] : Elevation [de-identified] : 11-4-22 [de-identified] :  [de-identified] : from home [de-identified] : 11-4-22

## 2023-03-17 ENCOUNTER — APPOINTMENT (OUTPATIENT)
Dept: PAIN MANAGEMENT | Facility: CLINIC | Age: 43
End: 2023-03-17

## 2023-03-21 ENCOUNTER — APPOINTMENT (OUTPATIENT)
Dept: PAIN MANAGEMENT | Facility: CLINIC | Age: 43
End: 2023-03-21

## 2023-03-29 RX ORDER — PREGABALIN 50 MG/1
50 CAPSULE ORAL 3 TIMES DAILY
Qty: 90 | Refills: 0 | Status: COMPLETED | COMMUNITY
Start: 2023-03-29 | End: 2023-03-29

## 2023-04-12 ENCOUNTER — APPOINTMENT (OUTPATIENT)
Dept: ORTHOPEDIC SURGERY | Facility: CLINIC | Age: 43
End: 2023-04-12
Payer: COMMERCIAL

## 2023-04-12 VITALS — BODY MASS INDEX: 31.18 KG/M2 | HEIGHT: 63 IN | WEIGHT: 176 LBS

## 2023-04-12 PROCEDURE — 99213 OFFICE O/P EST LOW 20 MIN: CPT

## 2023-04-12 NOTE — HISTORY OF PRESENT ILLNESS
[1] : 2 [Radiating] : radiating [Occasional] : occasional [Leisure] : leisure [Meds] : meds [Physical therapy] : physical therapy [Standing] : standing [Walking] : walking [Full time] : Work status: full time [de-identified] : 5/5/22: Pt is a 41 year old F who presents today for evaluation of their right foot. Pt states that\par Pain localized along the medial lateral dorsal plantar. Was seen at  and xrays done\par \par 6/8/22: Here for a second opinion for right foot/ankle pain. Pain started 4/29/22 after running. She was treated in a cam boot for 4 days. She saw Dr. Santos and was treated in [facility] brace without relief. Pain started in her heel but is now in the inside of her ankle. She also wears orthotics. She had an mri done at [facility]. \par \par 6/27/22  R Kidner procedure\par \par 7/6/22  f/u R leg, nwb in splint  Xarelto pending\par 8/3/22 f/u R leg nwb in slc\par 9/7/22  f/u R foot  HEP/PT   Considering L foot sugery 11/2022\par 10/19/22  Pre-op L leg\par \par 11/4/22 L Kidner\par \par 11/9/22 f/u L leg, nwb in splint  Xarelto\par 12/14/22  f/u L leg nwb in slc\par 1/11/23 f/u L foot.  WB in cast w/arch support  Started PT  Concerned about plantar fascia\par 2/1/23 f/u L foot  random pain and swelling and disloration  Improves with elevation.  Also w/ 1 mtp pain\par 2/22/23: f/u L foot. comfortable in sneakers. PT helpful . Seeing pain management. Recently started on Lyrica with some relief. Swelling/color changes better w/compression stockinjgs\par 4/12/23  f/u L ankle  HEP/PT  back on Lyrica 50 tid (had rebound) [] : Post Surgical Visit: no [FreeTextEntry1] : left foot [FreeTextEntry6] : "cold" pain near incision [FreeTextEntry7] : toes/foot to the knee "coldness", pain behind ankle [FreeTextEntry9] : compression socks [de-identified] : 11-4-22 [de-identified] :  [de-identified] : from home [de-identified] : 11-4-22

## 2023-04-12 NOTE — PHYSICAL EXAM
[Left] : left foot and ankle [2+] : dorsalis pedis pulse: 2+ [] : patient ambulates without assistive device [TWNoteComboBox7] : dorsiflexion 10 degrees

## 2023-04-26 NOTE — DATA REVIEWED
[MRI] : MRI [Left] : left [Ankle] : ankle [Report was reviewed and noted in the chart] : The report was reviewed and noted in the chart [I reviewed the films/CD] : I reviewed the films/CD

## 2023-04-27 ENCOUNTER — APPOINTMENT (OUTPATIENT)
Dept: PAIN MANAGEMENT | Facility: CLINIC | Age: 43
End: 2023-04-27
Payer: COMMERCIAL

## 2023-04-27 VITALS — BODY MASS INDEX: 31.18 KG/M2 | HEIGHT: 63 IN | WEIGHT: 176 LBS

## 2023-04-27 PROCEDURE — 99215 OFFICE O/P EST HI 40 MIN: CPT

## 2023-04-28 NOTE — PHYSICAL EXAM
[Bilateral] : foot and ankle bilaterally [Mild] : mild swelling of medial foot [de-identified] : Constitutional:\par - No acute distress\par - Well developed; well nourished\par \par Neurological:\par - normal mood and affect\par - alert and oriented x 3\par \par Cardiovascular:\par - grossly normal [] : no calf tenderness [FreeTextEntry3] : No obvious abnormalities of hair or nail growth [de-identified] : Some skin mottling noted [de-identified] : Mild allodynia/hyperalgesia; non- dermatomal or neurotomal distribution

## 2023-04-28 NOTE — HISTORY OF PRESENT ILLNESS
[Left Leg] : left leg [Gradual] : gradual [Meds] : meds [Walking] : walking [Heat] : heat [8] : 8 [3] : 3 [Dull/Aching] : dull/aching [Sharp] : sharp [Shooting] : shooting [Intermittent] : intermittent [Household chores] : household chores [Physical therapy] : physical therapy [Nothing helps with pain getting better] : Nothing helps with pain getting better [Standing] : standing [] : no [FreeTextEntry1] : LT FOOT  [FreeTextEntry2] : after kidner surgery left foot [FreeTextEntry9] : Lyrica has improver neuropathy [FreeTextEntry7] : dysesthesias up the leg bilaterally [de-identified] : coldness [de-identified] : X RAY

## 2023-04-28 NOTE — REASON FOR VISIT
[Follow-Up Visit] : a follow-up pain management visit [FreeTextEntry2] : Reevaluation bilateral ankle pain

## 2023-04-28 NOTE — ASSESSMENT
[FreeTextEntry1] : A discussion regarding available pain management treatment options occurred with the patient.  These included interventional, rehabilitative, pharmacological, and alternative modalities. We will proceed with the following:  \par \par Patient presenting for reevaluation of bilateral foot/ankle pain s/p bilateral Kidner procedure.  Patient currently displaying signs and symptoms consistent with CRPS based upon [place] criteria.  Currently appears to be moderate/high probability.\par \par Interventional treatment options:\par - Discussed lumbar sympathetic block with fluoroscopic guidance in further detail\par - Also discussed neuromodulation options; informational materials provided\par - see additional instructions below  \par \par Rehabilitative options:\par - continue physical/aqua therapy\par - participation in active HEP was discussed  \par \par Medication based treatment options:\par - uptitrated Lyrica 75mg BID; further titration based upon clinical response\par - continue Cymbalta 60 mg daily; further titration as per prescribing psychiatrist\par - continue topical OTC analgesics as needed\par - see additional instructions below  \par \par Complementary treatment options:\par - Weight management and lifestyle modifications discussed\par - Continue vitamin supplementation (currently vitamin C, magnesium, and turmeric)\par \par Psychological treatment options:\par - Patient will continue follow-up with psychiatrist as directed\par - Advise she discuss additional modalities including biofeedback and behavior modification\par \par Additional treatment recommendations as follows:\par - Patient will follow-up in 4-6 weeks\par \par The documentation recorded by the scribe, in my presence, accurately reflects the service I personally performed and the decisions made by me with my edits as appropriate. \par \par I, Clayton Bolton acting as scribe, attest that this documentation has been prepared under the direction and in the presence of Provider Jewel Davison DO.

## 2023-05-04 ENCOUNTER — OUTPATIENT (OUTPATIENT)
Dept: OUTPATIENT SERVICES | Facility: HOSPITAL | Age: 43
LOS: 1 days | End: 2023-05-04
Payer: COMMERCIAL

## 2023-05-04 ENCOUNTER — APPOINTMENT (OUTPATIENT)
Dept: ULTRASOUND IMAGING | Facility: CLINIC | Age: 43
End: 2023-05-04
Payer: COMMERCIAL

## 2023-05-04 DIAGNOSIS — Q79.60 EHLERS-DANLOS SYNDROME, UNSPECIFIED: ICD-10-CM

## 2023-05-04 DIAGNOSIS — Z82.49 FAMILY HISTORY OF ISCHEMIC HEART DISEASE AND OTHER DISEASES OF THE CIRCULATORY SYSTEM: ICD-10-CM

## 2023-05-04 PROCEDURE — 76775 US EXAM ABDO BACK WALL LIM: CPT

## 2023-05-04 PROCEDURE — 76775 US EXAM ABDO BACK WALL LIM: CPT | Mod: 26

## 2023-05-24 ENCOUNTER — APPOINTMENT (OUTPATIENT)
Dept: ORTHOPEDIC SURGERY | Facility: CLINIC | Age: 43
End: 2023-05-24
Payer: COMMERCIAL

## 2023-05-24 VITALS — HEIGHT: 63 IN | WEIGHT: 176 LBS | BODY MASS INDEX: 31.18 KG/M2

## 2023-05-24 PROCEDURE — 99213 OFFICE O/P EST LOW 20 MIN: CPT

## 2023-05-24 RX ORDER — FLUOXETINE HYDROCHLORIDE 40 MG/1
40 CAPSULE ORAL
Refills: 0 | Status: ACTIVE | COMMUNITY

## 2023-05-24 RX ORDER — CETIRIZINE HCL 10 MG
10 TABLET ORAL
Refills: 0 | Status: ACTIVE | COMMUNITY

## 2023-05-24 NOTE — HISTORY OF PRESENT ILLNESS
[3] : 3 [2] : 2 [Radiating] : radiating [Occasional] : occasional [Leisure] : leisure [Meds] : meds [Physical therapy] : physical therapy [Standing] : standing [Walking] : walking [Full time] : Work status: full time [de-identified] : 5/5/22: Pt is a 41 year old F who presents today for evaluation of their right foot. Pt states that\par Pain localized along the medial lateral dorsal plantar. Was seen at  and xrays done\par \par 6/8/22: Here for a second opinion for right foot/ankle pain. Pain started 4/29/22 after running. She was treated in a cam boot for 4 days. She saw Dr. Santos and was treated in [facility] brace without relief. Pain started in her heel but is now in the inside of her ankle. She also wears orthotics. She had an mri done at [facility]. \par \par 6/27/22  R Kidner procedure\par \par 7/6/22  f/u R leg, nwb in splint  Xarelto pending\par 8/3/22 f/u R leg nwb in slc\par 9/7/22  f/u R foot  HEP/PT   Considering L foot sugery 11/2022\par 10/19/22  Pre-op L leg\par \par 11/4/22 L Kidner\par \par 11/9/22 f/u L leg, nwb in splint  Xarelto\par 12/14/22  f/u L leg nwb in slc\par 1/11/23 f/u L foot.  WB in cast w/arch support  Started PT  Concerned about plantar fascia\par 2/1/23 f/u L foot  random pain and swelling and disloration  Improves with elevation.  Also w/ 1 mtp pain\par 2/22/23: f/u L foot. comfortable in sneakers. PT helpful . Seeing pain management. Recently started on Lyrica with some relief. Swelling/color changes better w/compression stockinjgs\par 4/12/23  f/u L ankle  HEP/PT  back on Lyrica 50 tid (had rebound)\par 5/24/23 f/u L and R feet.  PT/Pain mgt  Compression socks 20-30.  Lyrica 75 tid and Cymbalta 60 mg [] : Post Surgical Visit: no [FreeTextEntry1] : left foot [FreeTextEntry6] : "cold" pain near incision [FreeTextEntry7] : toes/foot to the knee "coldness", pain behind ankle [FreeTextEntry9] : compression socks [de-identified] : 11-4-22 [de-identified] :  [de-identified] : from home [de-identified] : 11-4-22

## 2023-05-24 NOTE — PHYSICAL EXAM
[Left] : left foot and ankle [2+] : dorsalis pedis pulse: 2+ [] : no plantar fascia tenderness [de-identified] : cold feeling to toes [TWNoteComboBox7] : dorsiflexion 10 degrees

## 2023-06-07 NOTE — REASON FOR VISIT
Pt resting in, playing with phone. Appears comfortable. Denies any needs at this time.      Elle Fernando RN  01/10/20 1747 [Follow-Up Visit] : a follow-up pain management visit

## 2023-06-08 ENCOUNTER — APPOINTMENT (OUTPATIENT)
Dept: PAIN MANAGEMENT | Facility: CLINIC | Age: 43
End: 2023-06-08
Payer: COMMERCIAL

## 2023-06-08 VITALS — HEIGHT: 63 IN | BODY MASS INDEX: 31.18 KG/M2 | WEIGHT: 176 LBS

## 2023-06-08 PROCEDURE — 99213 OFFICE O/P EST LOW 20 MIN: CPT

## 2023-06-08 NOTE — PHYSICAL EXAM
[Bilateral] : foot and ankle bilaterally [Mild] : mild swelling of medial foot [de-identified] : Constitutional:\par - No acute distress\par - Well developed; well nourished\par \par Neurological:\par - normal mood and affect\par - alert and oriented x 3\par \par Cardiovascular:\par - grossly normal [] : no calf tenderness [FreeTextEntry3] : No obvious abnormalities of hair or nail growth [de-identified] : Some skin mottling noted [de-identified] : Mild allodynia/hyperalgesia; non- dermatomal or neurotomal distribution

## 2023-06-08 NOTE — ASSESSMENT
[FreeTextEntry1] : A discussion regarding available pain management treatment options occurred with the patient.  These included interventional, rehabilitative, pharmacological, and alternative modalities. We will proceed with the following:  \par \par Patient presenting for reevaluation of bilateral foot/ankle pain s/p bilateral Kidner procedure.  Patient currently displaying signs and symptoms consistent with CRPS based upon [place] criteria.  Currently appears to be moderate/high probability.\par \par Interventional treatment options:\par - Previously discussed lumbar sympathetic block with fluoroscopic guidance -she defers at this point\par - Also discussed neuromodulation options; informational materials provided\par - see additional instructions below  \par \par Rehabilitative options:\par - continue physical/aqua therapy\par - Reeducated regarding rehabilitative care as mainstay of treatment for CRPS symptomatology\par - participation in active HEP was discussed  \par \par Medication based treatment options:\par - Continue Lyrica 75mg BID; further titration based upon clinical response\par - continue Cymbalta 90 mg daily as per prescribing psychiatrist\par - Recently started on methotrexate by rheumatology\par - continue topical OTC analgesics as needed\par - see additional instructions below  \par \par Complementary treatment options:\par - Weight management and lifestyle modifications discussed\par - Continue vitamin supplementation (currently vitamin C, magnesium, and turmeric)\par - Online resources provided for CRPS\par \par Psychological treatment options:\par - Patient will continue follow-up with psychiatrist as directed\par - Advised she discuss additional modalities including biofeedback and behavior modification\par \par Additional treatment recommendations as follows:\par - Patient will follow-up in 6 weeks\par \par The documentation recorded by the scribe, in my presence, accurately reflects the service I personally performed and the decisions made by me with my edits as appropriate. \par \par I, Clayton Bolton acting as scribe, attest that this documentation has been prepared under the direction and in the presence of Provider Jewel Davison DO.

## 2023-06-08 NOTE — HISTORY OF PRESENT ILLNESS
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

## 2023-06-26 ENCOUNTER — TRANSCRIPTION ENCOUNTER (OUTPATIENT)
Age: 43
End: 2023-06-26

## 2023-07-05 ENCOUNTER — APPOINTMENT (OUTPATIENT)
Dept: ORTHOPEDIC SURGERY | Facility: CLINIC | Age: 43
End: 2023-07-05
Payer: COMMERCIAL

## 2023-07-05 VITALS — BODY MASS INDEX: 31.18 KG/M2 | WEIGHT: 176 LBS | HEIGHT: 63 IN

## 2023-07-05 DIAGNOSIS — Z98.890 OTHER SPECIFIED POSTPROCEDURAL STATES: ICD-10-CM

## 2023-07-05 PROCEDURE — 99214 OFFICE O/P EST MOD 30 MIN: CPT

## 2023-07-05 NOTE — PHYSICAL EXAM
[Left] : left foot and ankle [2+] : dorsalis pedis pulse: 2+ [] : no plantar fascia tenderness [de-identified] : cold feeling to toes [TWNoteComboBox7] : dorsiflexion 10 degrees

## 2023-07-05 NOTE — REVIEW OF SYSTEMS
[Joint Pain] : joint pain [Joint Stiffness] : joint stiffness [Joint Swelling] : joint swelling [Muscle Weakness] : muscle weakness [Negative] : Endocrine [Feeling Tired] : no fatigue [FreeTextEntry9] : toe joint stiffness, ankle swelling

## 2023-07-05 NOTE — HISTORY OF PRESENT ILLNESS
[Radiating] : radiating [Occasional] : occasional [Leisure] : leisure [Meds] : meds [Physical therapy] : physical therapy [Standing] : standing [Walking] : walking [Full time] : Work status: full time [3] : 3 [de-identified] : 5/5/22: Pt is a 41 year old F who presents today for evaluation of their right foot. Pt states that\par Pain localized along the medial lateral dorsal plantar. Was seen at  and xrays done\par \par 6/8/22: Here for a second opinion for right foot/ankle pain. Pain started 4/29/22 after running. She was treated in a cam boot for 4 days. She saw Dr. Santos and was treated in [facility] brace without relief. Pain started in her heel but is now in the inside of her ankle. She also wears orthotics. She had an mri done at [facility]. \par \par 6/27/22  R Kidner procedure\par \par 7/6/22  f/u R leg, nwb in splint  Xarelto pending\par 8/3/22 f/u R leg nwb in slc\par 9/7/22  f/u R foot  HEP/PT   Considering L foot sugery 11/2022\par 10/19/22  Pre-op L leg\par \par 11/4/22 L Kidner\par \par 11/9/22 f/u L leg, nwb in splint  Xarelto\par 12/14/22  f/u L leg nwb in slc\par 1/11/23 f/u L foot.  WB in cast w/arch support  Started PT  Concerned about plantar fascia\par 2/1/23 f/u L foot  random pain and swelling and disloration  Improves with elevation.  Also w/ 1 mtp pain\par 2/22/23: f/u L foot. comfortable in sneakers. PT helpful . Seeing pain management. Recently started on Lyrica with some relief. Swelling/color changes better w/compression stockinjgs\par 4/12/23  f/u L ankle  HEP/PT  back on Lyrica 50 tid (had rebound)\par 5/24/23 f/u L and R feet.  PT/Pain mgt  Compression socks 20-30.  Lyrica 75 tid and Cymbalta 60 mg\par 7/5/23:  f/u L ankle. Seeing Rheumatologist. Orthotics. PT [] : Post Surgical Visit: no [FreeTextEntry1] : left foot [FreeTextEntry6] : "cold" pain near incision [FreeTextEntry7] : toes/foot to the knee "coldness", pain behind ankle [FreeTextEntry9] : compression socks [de-identified] : 11-4-22 [de-identified] :  [de-identified] : from home [de-identified] : 11-4-22

## 2023-07-27 ENCOUNTER — APPOINTMENT (OUTPATIENT)
Dept: PAIN MANAGEMENT | Facility: CLINIC | Age: 43
End: 2023-07-27
Payer: COMMERCIAL

## 2023-07-27 VITALS — HEIGHT: 63 IN | WEIGHT: 176 LBS | BODY MASS INDEX: 31.18 KG/M2

## 2023-07-27 PROCEDURE — 99213 OFFICE O/P EST LOW 20 MIN: CPT

## 2023-07-27 NOTE — PHYSICAL EXAM
[Bilateral] : foot and ankle bilaterally [Mild] : mild swelling of medial foot [de-identified] : Constitutional:\par - No acute distress\par - Well developed; well nourished\par \par Neurological:\par - normal mood and affect\par - alert and oriented x 3\par \par Cardiovascular:\par - grossly normal [] : no calf tenderness [FreeTextEntry3] : No obvious abnormalities of hair or nail growth [de-identified] : Some skin mottling noted [de-identified] : Mild allodynia/hyperalgesia; non- dermatomal or neurotomal distribution

## 2023-07-27 NOTE — HISTORY OF PRESENT ILLNESS
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

## 2023-08-09 ENCOUNTER — APPOINTMENT (OUTPATIENT)
Dept: ORTHOPEDIC SURGERY | Facility: CLINIC | Age: 43
End: 2023-08-09
Payer: COMMERCIAL

## 2023-08-09 VITALS — WEIGHT: 176 LBS | BODY MASS INDEX: 31.18 KG/M2 | HEIGHT: 63 IN

## 2023-08-09 DIAGNOSIS — Q74.2 OTHER CONGENITAL MALFORMATIONS OF LOWER LIMB(S), INCLUDING PELVIC GIRDLE: ICD-10-CM

## 2023-08-09 DIAGNOSIS — M76.821 POSTERIOR TIBIAL TENDINITIS, RIGHT LEG: ICD-10-CM

## 2023-08-09 DIAGNOSIS — M72.2 PLANTAR FASCIAL FIBROMATOSIS: ICD-10-CM

## 2023-08-09 DIAGNOSIS — M76.822 POSTERIOR TIBIAL TENDINITIS, LEFT LEG: ICD-10-CM

## 2023-08-09 DIAGNOSIS — G90.50 COMPLEX REGIONAL PAIN SYNDROME I, UNSPECIFIED: ICD-10-CM

## 2023-08-09 PROCEDURE — 99213 OFFICE O/P EST LOW 20 MIN: CPT

## 2023-08-09 NOTE — HISTORY OF PRESENT ILLNESS
[3] : 3 [Burning] : burning [Radiating] : radiating [Intermittent] : intermittent [Leisure] : leisure [Meds] : meds [Physical therapy] : physical therapy [Standing] : standing [Walking] : walking [Full time] : Work status: full time [de-identified] : 5/5/22: Pt is a 41 year old F who presents today for evaluation of their right foot. Pt states that Pain localized along the medial lateral dorsal plantar. Was seen at  and xrays done  6/8/22: Here for a second opinion for right foot/ankle pain. Pain started 4/29/22 after running. She was treated in a cam boot for 4 days. She saw Dr. Santos and was treated in airft brace without relief. Pain started in her heel but is now in the inside of her ankle. She also wears orthotics. She had an mri done at [facility].   6/27/22  R Kidner procedure  7/6/22  f/u R leg, nwb in splint  Xarelto pending 8/3/22 f/u R leg nwb in slc 9/7/22  f/u R foot  HEP/PT   Considering L foot sugery 11/2022 10/19/22  Pre-op L leg  11/4/22 L Kidner  11/9/22 f/u L leg, nwb in splint  Xarelto 12/14/22  f/u L leg nwb in slc 1/11/23 f/u L foot.  WB in cast w/arch support  Started PT  Concerned about plantar fascia 2/1/23 f/u L foot  random pain and swelling and disloration  Improves with elevation.  Also w/ 1 mtp pain 2/22/23: f/u L foot. comfortable in sneakers. PT helpful . Seeing pain management. Recently started on Lyrica with some relief. Swelling/color changes better w/compression stockinjgs 4/12/23  f/u L ankle  HEP/PT  back on Lyrica 50 tid (had rebound) 5/24/23 f/u L and R feet.  PT/Pain mgt  Compression socks 20-30.  Lyrica 75 tid and Cymbalta 60 mg 7/5/23:  f/u L ankle. Seeing Rheumatologist. Orthotics. PT 8/9/23  f/u both feet  HEP/PT  Pain mgt  Lyrica 75 tid and Cymbalta 90 [] : Post Surgical Visit: no [FreeTextEntry1] : left foot worse than the right  [FreeTextEntry6] : "cold" pain near incision, cramping [FreeTextEntry7] :  to the knee "coldness", pain behind ankle [FreeTextEntry9] : compression socks [de-identified] : 11-4-22 [de-identified] : aquatic therapy  [de-identified] :  [de-identified] : from home [de-identified] : 11-4-22

## 2023-08-14 ENCOUNTER — APPOINTMENT (OUTPATIENT)
Dept: ORTHOPEDIC SURGERY | Facility: CLINIC | Age: 43
End: 2023-08-14
Payer: COMMERCIAL

## 2023-08-14 DIAGNOSIS — Z00.00 ENCOUNTER FOR GENERAL ADULT MEDICAL EXAMINATION W/OUT ABNORMAL FINDINGS: ICD-10-CM

## 2023-08-14 DIAGNOSIS — M75.102 UNSPECIFIED ROTATOR CUFF TEAR OR RUPTURE OF LEFT SHOULDER, NOT SPECIFIED AS TRAUMATIC: ICD-10-CM

## 2023-08-14 DIAGNOSIS — M77.12 LATERAL EPICONDYLITIS, LEFT ELBOW: ICD-10-CM

## 2023-08-14 PROCEDURE — 99214 OFFICE O/P EST MOD 30 MIN: CPT | Mod: 25

## 2023-08-14 PROCEDURE — 20611 DRAIN/INJ JOINT/BURSA W/US: CPT | Mod: LT

## 2023-08-14 PROCEDURE — J3490M: CUSTOM

## 2023-08-14 PROCEDURE — 73030 X-RAY EXAM OF SHOULDER: CPT | Mod: LT

## 2023-08-14 PROCEDURE — 73502 X-RAY EXAM HIP UNI 2-3 VIEWS: CPT

## 2023-08-14 PROCEDURE — 73080 X-RAY EXAM OF ELBOW: CPT | Mod: LT

## 2023-08-15 PROBLEM — Z00.00 ENCOUNTER FOR PREVENTIVE HEALTH EXAMINATION: Status: ACTIVE | Noted: 2022-05-05

## 2023-08-15 NOTE — HISTORY OF PRESENT ILLNESS
[de-identified] : The patient is a 42 year old LHD who presents today complaining of left hip, shoulder & elbow pain.   Date of Injury/Onset:  February 2023 (shoulder) April 2023 (hip) Pain:    At Rest: varies/10  With Activity:  varies-8 /10  Mechanism of injury: No specific cause of injury/ trauma. Quality of symptoms:  Constant aching, weakness (shoulder) sharp (elbow) Radiating  (hip)  Improves with: PT  Worse with: Lifting (shoulder) sitting, extending (hip) Prior treatment: PT for left hip/ shoullder/ elbow & CSI for shoulder 2016 / Prior Imaging: HIP MRI @   Occupation:   Additional Information: Currently seeing a chiropractor.

## 2023-08-15 NOTE — PROCEDURE
[Large Joint Injection] : Large joint injection [Left] : of the left [Hip] : hip [Pain] : pain [Inflammation] : inflammation [Alcohol] : alcohol [Betadine] : betadine [Ethyl Chloride sprayed topically] : ethyl chloride sprayed topically [Sterile technique used] : sterile technique used [___ cc    6mg] :  Betamethasone (Celestone) ~Vcc of 6mg [___ cc    1%] : Lidocaine ~Vcc of 1%  [___ cc    0.25%] : Bupivacaine (Marcaine) ~Vcc of 0.25%  [] : Patient tolerated procedure well [Call if redness, pain or fever occur] : call if redness, pain or fever occur [Apply ice for 15min out of every hour for the next 12-24 hours as tolerated] : apply ice for 15 minutes out of every hour for the next 12-24 hours as tolerated [Previous OTC use and PT nontherapeutic] : patient has tried OTC's including aspirin, Ibuprofen, Aleve, etc or prescription NSAIDS, and/or exercises at home and/or physical therapy without satisfactory response [Patient had decreased mobility in the joint] : patient had decreased mobility in the joint [Risks, benefits, alternatives discussed / Verbal consent obtained] : the risks benefits, and alternatives have been discussed, and verbal consent was obtained [Precise injection in area of tear] : precise injection in area of tear [All ultrasound images have been permanently captured and stored accordingly in our picture archiving and communication system] : All ultrasound images have been permanently captured and stored accordingly in our picture archiving and communication system [Visualization of the needle and placement of injection was performed without complication] : visualization of the needle and placement of injection was performed without complication

## 2023-08-15 NOTE — DISCUSSION/SUMMARY
[de-identified] : Assessment: The patient is a 42 year old female with chronic left hip, elbow and shoulder pain and imaging and physical exam findings consistent with left shoulder impingement syndrome, left elbow lateral epicondylitis, and left hip GT bursitis and RAHEEL.  Patient and I discussed their symptoms. Discussed findings of today's exam and possible causes of patient's pain. Educated patient on their most probable diagnosis. Reviewed possible courses of treatment, and we collaboratively decided best course of treatment at this time will include:  1. PT for left shoulder, elbow and hip 2. CSI left hip with ultrasound 3. PT 4. NSAIDs  The patient's current medication management of their orthopedic diagnosis was reviewed today:   (1) We discussed a comprehensive treatment plan that included possible pharmaceutical management involving the use of prescription strength medications including but not limited to options such as oral Naprosyn 500mg BID, once daily Meloxicam 15 mg, or 500-650 mg Tylenol versus over the counter oral medications and topical prescription NSAID Pennsaid vs over the counter Voltaren gel.   (2) There is a moderate risk of morbidity with further treatment, especially from use of prescription strength medications and possible side effects of these medications which include upset stomach with oral medications, skin reactions to topical medications and cardiac/renal issues with long term use.   (3) I recommended that the patient follow-up with their medical physician to discuss any significant specific potential issues with long term medication use such as interactions with current medications or with exacerbation of underlying medical comorbidities.   (4) The benefits and risks associated with use of injectable, oral or topical, prescription and over the counter anti-inflammatory medications were discussed with the patient. The patient voiced understanding of the risks including but not limited to bleeding, stroke, kidney dysfunction, heart disease, and were referred to the black box warning label for further information.  Prior to appointment and during encounter with patient extensive medical records were reviewed including but not limited to, hospital records, out patient records, imaging results, and lab data. During this appointment the patient was examined, diagnoses were discussed and explained in a face to face manner. In addition extensive time was spent reviewing aforementioned diagnostic studies. Counseling including abnormal image results, differential diagnoses, treatment options, risk and benefits, lifestyle changes, current condition, and current medications was performed. Patient's comments, questions, and concerns were address and patient verbalized understanding.  Follow up in 6-8 weeks.

## 2023-08-15 NOTE — PHYSICAL EXAM
[Normal Coordination] : normal coordination [Normal DTR UE/LE] : normal DTR UE/LE  [Normal Sensation] : normal sensation [Normal Mood and Affect] : normal mood and affect [Orientated] : orientated [Normal Skin] : normal skin [No Rash] : no rash [No Ulcers] : no ulcers [No Lesions] : no lesions [No obvious lymphadenopathy in areas examined] : no obvious lymphadenopathy in areas examined [NL (0-180)] : full active abduction 0-180 degrees [NL (0-70)] : full internal rotation 0-70 degrees [NL (0-90)] : full external rotation 0-90 degrees [Type 2 acromion] : Type 2 acromion [NL (150)] : flexion 150 degrees [NL (0)] : extension 0 degrees [NL (90)] : supination 90 degrees [No acute displaced fracture or dislocation] : No acute displaced fracture or dislocation [Left] : left hip [NL (120)] : flexion 120 degrees [NL (30)] : extension 30 degrees [NL (35)] : adduction 35 degrees [NL (45)] : internal rotation 45 degrees [5___] : adduction 5[unfilled]/5 [2+] : posterior tibialis pulse: 2+ [] : patient ambulates without assistive device [There are no fractures, subluxations or dislocations. No significant abnormalities are seen] : There are no fractures, subluxations or dislocations. No significant abnormalities are seen

## 2023-08-17 ENCOUNTER — APPOINTMENT (OUTPATIENT)
Dept: ORTHOPEDIC SURGERY | Facility: CLINIC | Age: 43
End: 2023-08-17

## 2023-08-24 ENCOUNTER — APPOINTMENT (OUTPATIENT)
Dept: ORTHOPEDIC SURGERY | Facility: CLINIC | Age: 43
End: 2023-08-24
Payer: COMMERCIAL

## 2023-08-24 ENCOUNTER — APPOINTMENT (OUTPATIENT)
Dept: ORTHOPEDIC SURGERY | Facility: CLINIC | Age: 43
End: 2023-08-24

## 2023-08-24 PROCEDURE — 99213 OFFICE O/P EST LOW 20 MIN: CPT | Mod: 95

## 2023-08-28 ENCOUNTER — APPOINTMENT (OUTPATIENT)
Dept: ORTHOPEDIC SURGERY | Facility: CLINIC | Age: 43
End: 2023-08-28
Payer: COMMERCIAL

## 2023-08-28 DIAGNOSIS — M76.02 GLUTEAL TENDINITIS, LEFT HIP: ICD-10-CM

## 2023-08-28 PROCEDURE — 99214 OFFICE O/P EST MOD 30 MIN: CPT | Mod: 25

## 2023-08-28 PROCEDURE — 20611 DRAIN/INJ JOINT/BURSA W/US: CPT | Mod: LT

## 2023-08-28 PROCEDURE — J3490M: CUSTOM

## 2023-09-05 PROBLEM — M76.02 GLUTEAL TENDINITIS OF LEFT BUTTOCK: Status: ACTIVE | Noted: 2023-09-05

## 2023-09-05 NOTE — DISCUSSION/SUMMARY
[de-identified] : Assessment:   The patient presents with history, examination and imaging that are most consistent with a diagnosis of:  GLUTEAL TENDONITIS   The patient would like to pursue conservative measures at this time. After consideration of various non-operative treatment modalities, the patient would like to proceed with the modalities listed below.   Prior to appointment and during encounter with patient extensive medical records were reviewed including but not limited to, hospital records, out patient records, imaging results, and lab data. During this appointment the patient was examined, diagnoses were discussed and explained in a face to face manner. In addition extensive time was spent reviewing aforementioned diagnostic studies. Counseling including abnormal image results, differential diagnoses, treatment options, risk and benefits, lifestyle changes, current condition, and current medications was performed. Patient's comments, questions, and concerns were address and patient verbalized understanding.  ---------------------------     Plan:    Counseling:     - Patient recommended to modify their activities until symptoms resolve.     Physical Therapy:    - Referral placed to ambulatory physical therapy.    - Therapy protocol provided to guide the treating therapist.    - Patient to schedule therapy session at their earliest convenience.    - Home exercise program from [facility] provided to patient as an alternative.  NSAIDs:     - Medication script sent to the patients preferred pharmacy.    - Patient was instructed to take this medication with food on an as needed basis.    - Patient educated on the gastrointestinal side effects with excessive use.   The patient's current medication management of their orthopedic diagnosis was reviewed today:   (1) We discussed a comprehensive treatment plan that included possible pharmaceutical management involving the use of prescription strength medications including but not limited to options such as oral Naprosyn 500mg BID, once daily Meloxicam 15 mg, or 500-650 mg Tylenol versus over the counter oral medications and topical prescription NSAID Pennsaid vs over the counter Voltaren gel.   (2) There is a moderate risk of morbidity with further treatment, especially from use of prescription strength medications and possible side effects of these medications which include upset stomach with oral medications, skin reactions to topical medications and cardiac/renal issues with long term use.   (3) I recommended that the patient follow-up with their medical physician to discuss any significant specific potential issues with long term medication use such as interactions with current medications or with exacerbation of underlying medical comorbidities.   (4) The benefits and risks associated with use of injectable, oral or topical, prescription and over the counter anti-inflammatory medications were discussed with the patient. The patient voiced understanding of the risks including but not limited to bleeding, stroke, kidney dysfunction, heart disease, and were referred to the black box warning label for further information.     Injection:     - Corticosteroid injection performed today in the office.     - Refer to procedure section for further details.   Follow-up:  3 MONTHS

## 2023-09-05 NOTE — PHYSICAL EXAM
[Normal Coordination] : normal coordination [Normal DTR UE/LE] : normal DTR UE/LE  [Normal Sensation] : normal sensation [Normal Mood and Affect] : normal mood and affect [Orientated] : orientated [Normal Skin] : normal skin [No Rash] : no rash [No Ulcers] : no ulcers [No Lesions] : no lesions [No obvious lymphadenopathy in areas examined] : no obvious lymphadenopathy in areas examined [Left] : left hip [NL (120)] : flexion 120 degrees [NL (30)] : extension 30 degrees [NL (35)] : adduction 35 degrees [NL (45)] : internal rotation 45 degrees [5___] : adduction 5[unfilled]/5 [2+] : posterior tibialis pulse: 2+ [] : patient ambulates without assistive device [There are no fractures, subluxations or dislocations. No significant abnormalities are seen] : There are no fractures, subluxations or dislocations. No significant abnormalities are seen

## 2023-09-05 NOTE — HISTORY OF PRESENT ILLNESS
[de-identified] : 08/28/2023   ROBERTO CARLOS is presenting today for followup. Pain and symptoms are 100% IMPROVED WITH RESPECT TO HER GROIN PAIN. SHE NOW ENDORSES LATERAL AND POSTERIOR HIP PAIN THAT DOES NOT RADIATE. She has been DOING A HEP. She denies any numbness/tingling/fevers/chills.

## 2023-09-20 ENCOUNTER — APPOINTMENT (OUTPATIENT)
Dept: ORTHOPEDIC SURGERY | Facility: CLINIC | Age: 43
End: 2023-09-20
Payer: COMMERCIAL

## 2023-09-20 VITALS — BODY MASS INDEX: 31.18 KG/M2 | HEIGHT: 63 IN | WEIGHT: 176 LBS

## 2023-09-20 PROCEDURE — 99213 OFFICE O/P EST LOW 20 MIN: CPT

## 2023-10-05 ENCOUNTER — APPOINTMENT (OUTPATIENT)
Dept: PAIN MANAGEMENT | Facility: CLINIC | Age: 43
End: 2023-10-05
Payer: COMMERCIAL

## 2023-10-05 VITALS — HEIGHT: 63 IN | WEIGHT: 196 LBS | BODY MASS INDEX: 34.73 KG/M2

## 2023-10-05 PROCEDURE — 99213 OFFICE O/P EST LOW 20 MIN: CPT

## 2023-11-01 ENCOUNTER — APPOINTMENT (OUTPATIENT)
Dept: ORTHOPEDIC SURGERY | Facility: CLINIC | Age: 43
End: 2023-11-01
Payer: COMMERCIAL

## 2023-11-01 VITALS — WEIGHT: 196 LBS | HEIGHT: 63 IN | BODY MASS INDEX: 34.73 KG/M2

## 2023-11-01 DIAGNOSIS — Q74.2 OTHER CONGENITAL MALFORMATIONS OF LOWER LIMB(S), INCLUDING PELVIC GIRDLE: ICD-10-CM

## 2023-11-01 PROCEDURE — 99213 OFFICE O/P EST LOW 20 MIN: CPT

## 2023-11-14 ENCOUNTER — APPOINTMENT (OUTPATIENT)
Dept: NEUROLOGY | Facility: CLINIC | Age: 43
End: 2023-11-14
Payer: COMMERCIAL

## 2023-11-14 PROCEDURE — 95886 MUSC TEST DONE W/N TEST COMP: CPT

## 2023-11-14 PROCEDURE — 95912 NRV CNDJ TEST 11-12 STUDIES: CPT

## 2023-11-22 ENCOUNTER — RESULT REVIEW (OUTPATIENT)
Age: 43
End: 2023-11-22

## 2023-11-22 ENCOUNTER — APPOINTMENT (OUTPATIENT)
Dept: ORTHOPEDIC SURGERY | Facility: CLINIC | Age: 43
End: 2023-11-22
Payer: COMMERCIAL

## 2023-11-22 VITALS — BODY MASS INDEX: 34.73 KG/M2 | HEIGHT: 63 IN | WEIGHT: 196 LBS

## 2023-11-22 PROCEDURE — 99214 OFFICE O/P EST MOD 30 MIN: CPT

## 2023-12-01 ENCOUNTER — APPOINTMENT (OUTPATIENT)
Dept: ORTHOPEDIC SURGERY | Facility: CLINIC | Age: 43
End: 2023-12-01
Payer: COMMERCIAL

## 2023-12-01 DIAGNOSIS — M25.852 OTHER SPECIFIED JOINT DISORDERS, LEFT HIP: ICD-10-CM

## 2023-12-01 PROCEDURE — J3490M: CUSTOM

## 2023-12-01 PROCEDURE — 20611 DRAIN/INJ JOINT/BURSA W/US: CPT | Mod: LT

## 2023-12-01 PROCEDURE — 99214 OFFICE O/P EST MOD 30 MIN: CPT | Mod: 25

## 2023-12-08 ENCOUNTER — APPOINTMENT (OUTPATIENT)
Dept: ORTHOPEDIC SURGERY | Facility: CLINIC | Age: 43
End: 2023-12-08
Payer: COMMERCIAL

## 2023-12-08 VITALS — HEIGHT: 63 IN | BODY MASS INDEX: 35.08 KG/M2 | WEIGHT: 198 LBS

## 2023-12-08 PROCEDURE — 99204 OFFICE O/P NEW MOD 45 MIN: CPT

## 2023-12-11 ENCOUNTER — APPOINTMENT (OUTPATIENT)
Dept: PAIN MANAGEMENT | Facility: CLINIC | Age: 43
End: 2023-12-11
Payer: COMMERCIAL

## 2023-12-11 VITALS — BODY MASS INDEX: 35.44 KG/M2 | HEIGHT: 63 IN | WEIGHT: 200 LBS

## 2023-12-11 PROCEDURE — 99214 OFFICE O/P EST MOD 30 MIN: CPT

## 2023-12-11 RX ORDER — PREDNISONE 10 MG/1
10 TABLET ORAL
Refills: 0 | Status: DISCONTINUED | COMMUNITY
End: 2023-12-11

## 2023-12-11 RX ORDER — RIVAROXABAN 10 MG/1
10 TABLET, FILM COATED ORAL
Qty: 30 | Refills: 0 | Status: DISCONTINUED | COMMUNITY
Start: 2022-06-27 | End: 2023-12-11

## 2023-12-15 NOTE — IMAGING
[de-identified] : General: NAD, A&Ox3 Gait: Non-antalgic, no Trendelenburg Foot Progression: neutral Knee Progression: neutral   L Hip Exam: Pain with Log Roll - negative Flexion: 130 Pain with Hyperflexion - moderate FADIR - pos for groin poin LAWRENCE - pos for groin pain Extension: 20 Flexion Contracture - none Prone Apprehension Test - neg Prone Rotation Test - symmetric Ischial tenderness - none Trochanteric tenderness - none   Abduction - 4 /5, pain - yes Adduction - 5 /5 Supine resisted SLR - 5 /5, pain - no Seated resisted hip flexion - 5 /5, pain - no Dorsiflexion 5/5 Plantarflexion 5/5 EHL 5/5 DP/PT 2+, foot is warm and well perfused        Leg Lengths: symmetric

## 2023-12-15 NOTE — DISCUSSION/SUMMARY
[de-identified] : Mirna and I had a long discussion today.  We discussed surgical options in the form of a hip arthroscopy, hip arthroscopy and AUSTYN, and hip replacement.  Her hypermobility EDS complicates and isolated hip arthroscopy as the results of this are variable in the literature.  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  We also discussed combined hip arthroscopy and AUSTYN and given her age she is a poor candidate for this.  We also discussed hip replacement.  This would be aggressive at this point because she has minimal arthritic change on her x-ray.  There is also chance of persistent pain as well as a higher than normal risk of dislocation given her hypermobility.  Nonsurgical options were perfect we discussed this today.  She is scheduled for an GENESIS in her back and will see me back after this to further discuss.  All of her questions were answered she is in agreement with this plan.

## 2023-12-15 NOTE — HISTORY OF PRESENT ILLNESS
[de-identified] : 12/8/23   ROBERTO CARLOS CARCAMO is a 43 year female here with lt  hip pain.  Pain has been present for months and is located lt hip.  Rates pain as [5 ] /10 on pain scale.   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

## 2023-12-15 NOTE — DATA REVIEWED
[FreeTextEntry1] : No abnormalities [FreeTextEntry2] : L hip anterior superior labral tear, no evidence of CAM impingement

## 2023-12-20 ENCOUNTER — APPOINTMENT (OUTPATIENT)
Dept: PAIN MANAGEMENT | Facility: CLINIC | Age: 43
End: 2023-12-20
Payer: COMMERCIAL

## 2023-12-20 PROCEDURE — 62323 NJX INTERLAMINAR LMBR/SAC: CPT

## 2023-12-20 NOTE — PROCEDURE
[FreeTextEntry3] : Date of Service: 12/20/2023   Account: [id number]  Patient: ROBERTO CARLOS CARCAMO   YOB: 1980  Age: 43 year  Surgeon:      Jewel Davison DO  Assistant:    None  Pre-Operative Diagnosis:         Lumbosacral Radiculitis (M54.17)  Post Operative Diagnosis:       Lumbosacral Radiculitis (M54.17)     Procedure:             Lumbar interlaminar (L5-S1) epidural steroid injection under fluoroscopic guidance  Anesthesia:            MAC  This procedure was carried out using fluoroscopic guidance.  The risks and benefits of the procedure were discussed extensively with the patient.  The consent of the patient was obtained and the following procedure was performed.  A timeout was performed with all essential staff present and the site and side were verified.  The patient was placed in the prone position with a pillow under the abdomen to minimize the lumbar lordosis.  The lumbar area was prepped and draped in a sterile fashion.  Under A/P view with slight cephalad-caudad angulation, the L5-S1 interspace was identified and marked.  Using sterile technique the superficial skin was anesthetized with 1% Lidocaine.  A 20 gauge Tuohy needle was advanced into the epidural space under fluoroscopy using loss of resistance at the L5-S1 level.  After negative aspiration for heme or CSF, an epidurogram was obtained in the A/P and lateral fluoroscopic views using 2-3 cc of Omnipaque contrast confirming epidural placement of the needle.  After this, 5 cc of of a mixture of preservative free normal saline and 40 mg of Kenalog were injected into the epidural space.   Vital signs remained normal throughout the procedure.  The patient tolerated the procedure well.  There were no immediate complications from the performed procedure.  The patient was instructed to apply ice over the injection sites for twenty minutes every two hours for the next 24 hours.  Disposition:      1. The patient was advised to F/U in 1-2 weeks to assess the response to the injection.      2. The patient was also instructed to contact me immediately if there were any concerns related to the procedure performed.

## 2024-01-05 ENCOUNTER — APPOINTMENT (OUTPATIENT)
Dept: ORTHOPEDIC SURGERY | Facility: CLINIC | Age: 44
End: 2024-01-05
Payer: COMMERCIAL

## 2024-01-05 VITALS — BODY MASS INDEX: 34.65 KG/M2 | HEIGHT: 63.5 IN | WEIGHT: 198 LBS

## 2024-01-05 DIAGNOSIS — Q79.60 EHLERS-DANLOS SYNDROME, UNSP: ICD-10-CM

## 2024-01-05 DIAGNOSIS — S73.192A OTHER SPRAIN OF LEFT HIP, INITIAL ENCOUNTER: ICD-10-CM

## 2024-01-05 PROCEDURE — 99213 OFFICE O/P EST LOW 20 MIN: CPT

## 2024-01-05 NOTE — HISTORY OF PRESENT ILLNESS
[8] : 8 [4] : 4 [Dull/Aching] : dull/aching [Shooting] : shooting [Constant] : constant [Household chores] : household chores [Leisure] : leisure [Work] : work [Rest] : rest [Meds] : meds [Injection therapy] : injection therapy [Sitting] : sitting [Standing] : standing [Walking] : walking [Stairs] : stairs [Full time] : Work status: full time [Steroid] : Steroid [de-identified] : 12/8/23   ROBERTO CARLOS CARCAMO is a 43 year female here with lt  hip pain.  Pain has been present for months and is located lt hip.  Rates pain as [5 ] /10 on pain scale.   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 does home PT on her own. 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

## 2024-01-05 NOTE — IMAGING
[de-identified] : General: NAD, A&Ox3 Gait: Non-antalgic, no Trendelenburg Foot Progression: neutral Knee Progression: neutral   L Hip Exam: Pain with Log Roll - negative Flexion: 130 Pain with Hyperflexion - moderate FADIR - pos for groin poin LAWRENCE - pos for groin pain Extension: 20 Flexion Contracture - none Prone Apprehension Test - neg Prone Rotation Test - symmetric Ischial tenderness - none Trochanteric tenderness - none   Abduction - 4 /5, pain - yes Adduction - 5 /5 Supine resisted SLR - 5 /5, pain - no Seated resisted hip flexion - 5 /5, pain - no Dorsiflexion 5/5 Plantarflexion 5/5 EHL 5/5 DP/PT 2+, foot is warm and well perfused        Leg Lengths: symmetric

## 2024-01-05 NOTE — DISCUSSION/SUMMARY
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  She has tried and failed conservative treatment.   We discussed surgery in the form of hip arthroscopy today.  Risks related to hip arthroscopy include but are not limited to surgical site infection, damage to neurovascular structures around the hip, persistent pain, damage to intraarticular structures, scar tissue and adhesion formation, heterotopic bone formation post operative blood clot and the need for post operative blood thinners.  We discussed the requirement for a prolonged period of limited weight bearing on crutches post operatively and the importance of physical therapy.  she  was able to state understanding of the above and was agreeable to proceed with surgery.  We did discuss the risk of hip arthroscopy in patients with Pasquale-Danlos.  We discussed that surgery will be arthroscopic labral pair with capsular plication.  We discussed that there is some literature showing good outcomes at 2 years however the long-term outcomes can be less predictable patients with Pasquale-Danlos.  We also discussed risk of CRPS in her hip afterwards especially considering her prior history of this.  We also discussed aggravation of the CRPS in her feet from traction on the boot.  She was able to state understanding of all of these risks and was agreeable to proceed with surgery.  Prior to appointment and during encounter with patient extensive medical records were reviewed including but not limited to, hospital records, out patient records, imaging results, and lab data. During this appointment the patient was examined, diagnoses were discussed and explained in a face to face manner. In addition extensive time was spent reviewing aforementioned diagnostic studies. Counseling including abnormal image results, differential diagnoses, treatment options, risk and benefits, lifestyle changes, current condition, and current medications was performed. Patient's comments, questions, and concerns were address and patient verbalized understanding.

## 2024-01-15 ENCOUNTER — APPOINTMENT (OUTPATIENT)
Dept: PAIN MANAGEMENT | Facility: CLINIC | Age: 44
End: 2024-01-15

## 2024-01-23 NOTE — DATA REVIEWED
[Left] : left [Ankle] : ankle [MRI] : MRI [Lumbar Spine] : lumbar spine [Report was reviewed and noted in the chart] : The report was reviewed and noted in the chart [I reviewed the films/CD] : I reviewed the films/CD

## 2024-01-25 ENCOUNTER — APPOINTMENT (OUTPATIENT)
Dept: PAIN MANAGEMENT | Facility: CLINIC | Age: 44
End: 2024-01-25
Payer: COMMERCIAL

## 2024-01-25 VITALS — WEIGHT: 198 LBS | BODY MASS INDEX: 35.08 KG/M2 | HEIGHT: 63 IN

## 2024-01-25 PROCEDURE — 99214 OFFICE O/P EST MOD 30 MIN: CPT

## 2024-01-25 NOTE — PHYSICAL EXAM
[Bilateral] : foot and ankle bilaterally [Mild] : mild swelling of medial foot [de-identified] : Constitutional: - No acute distress - Well developed; well nourished  Neurological: - normal mood and affect - alert and oriented x 3  Cardiovascular: - grossly normal  Lumbar Spine Exam:  Inspection: erythema (-) ecchymosis (-) rashes (-) alignment: no scoliosis  Palpation: Midline lumbar tenderness:             (-) midline thoracic tenderness:          (-) Lumbar paraspinal tenderness:      L (-) ; R (-) thoracic paraspinal tenderness:    L (-) ; R (-) sciatic nerve tenderness :             L (-) ; R (-) SI joint tenderness:                        L (-) ; R (-) GTB tenderness:                            L (+);  R (-)  ROM: WNL  Strength:                                    Right       Left    Hip Flexion:                (5/5)       (5/5) Quadriceps:               (5/5)       (5/5) Hamstrings:                (5/5)       (5/5) Ankle Dorsiflexion:     (5/5)       (5/5) EHL:                           (5/5)       (5/5) Ankle Plantarflexion:  (5/5)       (5/5)  Special Tests: SLR:                           R (=) ; L (=) Facet loading:            R (-) ; L (-) LAWRENCE test:               R (-) ; L (-) Hamstring tightness:  R (-);  L (-)  Neurologic: SI LT throughout right lower extremity SI LT throughout left lower extremity  Reflexes normal and symmetric bilateral lower extremities  Gait: non- antalgic gait ambulates without assistive device [] : no calf tenderness [FreeTextEntry3] : No obvious abnormalities of hair or nail growth [de-identified] : Mild allodynia/hyperalgesia; non- dermatomal or neurotomal distribution [de-identified] : Some skin mottling noted

## 2024-01-25 NOTE — ASSESSMENT
[FreeTextEntry1] : A discussion regarding available pain management treatment options occurred with the patient. These included interventional, rehabilitative, pharmacological, and alternative modalities. We will proceed with the following:  Interventional treatment options: - Proceed with bilateral L5-S1 TFESI with fluoroscopic guidance - Once again discussed diagnostic and potentially therapeutic role for indicated procedure - Previously indicated for lumbar sympathetic block for CRPS symptomatology; patient defers - see additional instructions below  Neuromodulation treatment options: - Previously discussed neuromodulation options for her chronic, persistent neuropathic pain secondary to complex regional pain syndrome; informational materials provided - At this point she defers consideration  Rehabilitative options: - continue physical/aqua therapy as per orthopedic surgery - Previous education provided regarding rehabilitative care as mainstay of treatment for CRPS symptomatology - participation in active HEP was discussed and encouraged as tolerated  Medication based treatment options: - Failed Cymbalta, Lyrica - Poor candidate for oral NSAIDs secondary to prior bariatric surgery - Recently started on methotrexate by rheumatology - continue topical OTC analgesics as needed - see additional instructions below  Complementary treatment options: - Weight management and lifestyle modifications discussed - Continue vitamin supplementation (currently vitamin C, magnesium, and turmeric) - Online resources provided for CRPS  Psychological treatment options: - Patient will continue follow-up with psychiatrist as directed - Continue biofeedback and behavior modification with treating psychiatrist  Additional treatment recommendations as follows: - Follow-up with orthopedics as directed re: left hip labral tear - Follow up 1-2 weeks post injection for assessment of efficacy and further treatment recommendations  The risks, benefits and alternatives of the proposed procedure were explained in detail with the patient. The risks outlined include but are not limited to infection, bleeding, post- dural puncture headache, nerve injury, a temporary increase in pain, failure to resolve symptoms, allergic reaction, and possible elevation of blood sugar in diabetics if using corticosteroid.  All questions were answered to patient's apparent satisfaction and he/she verbalized an understanding.  The documentation recorded by the scribe, in my presence, accurately reflects the service I personally performed and the decisions made by me with my edits as appropriate.  I, Clayton Bolton acting as scribe, attest that this documentation has been prepared under the direction and in the presence of Provider Jewel Davison DO.

## 2024-01-25 NOTE — HISTORY OF PRESENT ILLNESS
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

## 2024-01-25 NOTE — REASON FOR VISIT
[Follow-Up Visit] : a follow-up pain management visit [FreeTextEntry2] : Low back/bilateral leg pain

## 2024-01-31 ENCOUNTER — APPOINTMENT (OUTPATIENT)
Dept: PAIN MANAGEMENT | Facility: CLINIC | Age: 44
End: 2024-01-31
Payer: COMMERCIAL

## 2024-01-31 PROCEDURE — 64483 NJX AA&/STRD TFRM EPI L/S 1: CPT | Mod: LT

## 2024-01-31 NOTE — PROCEDURE
[FreeTextEntry3] : Date of Service: 01/31/2024   Account: [id number]  Patient: ROBERTO CARLOS CARCAMO   YOB: 1980  Age: 43 year   Surgeon:      Jewel Davison DO  Assistant:    None  Pre-Operative Diagnosis:         Lumbosacral Radiculopathy                   Post Operative Diagnosis:       Lumbosacral Radiculopathy                 Procedure:             Bilateral L5-S1 transforaminal epidural steroid injection under fluoroscopic guidance.  Anesthesia: MAC  This procedure was carried out using fluoroscopic guidance.  The risks and benefits of the procedure were discussed extensively with the patient.  The consent of the patient was obtained and the following procedure was performed.  A timeout was performed with all essential staff present and the site and side were verified.  The right L5-S1 neural foramen was then identified on right oblique "mary dog" anatomical view at the 6 o' clock position using fluoroscopic guidance, and the area was marked. The overlying skin and subcutaneous structures were anesthetized using sterile technique with 1% Lidocaine.   A 22-gauge 5-inch spinal needle was directed toward the inferior (6 o'clock) position of the pedicle, which formed the roof of the identified foramen.  Once in the epidural space, after negative aspiration for heme and CSF, 1cc of Omnipaque contrast was injected to confirm epidural location and assess filling defects and rule out intravascular needle placement.   Lumbar epidurogram showed no intravascular or intrathecal flow pattern.  No blood or CSF was aspirated.  Omnipaque spread medially in epidural space and outlined the exiting nerve root.  After this, 2.5 cc of a mixture of 3 cc of preservative free normal saline plus 80 mg of Kenalog was injected in the epidural space.  Then attention was focused to the left L5-S1 neural foramen. This was then identified on left oblique "mary dog" anatomical view at the 6 o' clock position using fluoroscopic guidance, and the area was marked.  The overlying skin and subcutaneous structures were anesthetized using sterile technique with 1% Lidocaine.  A 22-gauge 5-inch spinal needle was directed toward the inferior (6 o'clock) position of the pedicle, which formed the roof of the identified foramen.  Once in the epidural space, after negative aspiration for heme and CSF, 1cc of Omnipaque contrast was injected to confirm epidural location and assess filling defects and rule out intravascular needle placement.   The following contrast flow observed: no intravascular or intrathecal flow pattern was noted.  No blood or CSF was aspirated. Omnipaque spread medially in epidural space.    After this, the remainder of the injectate listed above was injected in the epidural space.  Vital signs remained normal throughout the procedure.  The patient tolerated the procedure well.  There were no immediate complications from the performed procedure.  The patient was instructed to apply ice over the injection sites for twenty minutes every two hours for the next 24 hours.  Disposition:      1.  The patient was advised to F/U in 1-2 weeks to assess the response to the injection.      2.  The patient was also instructed to contact me immediately if there were any concerns related to the procedure performed.

## 2024-02-13 ENCOUNTER — APPOINTMENT (OUTPATIENT)
Dept: PAIN MANAGEMENT | Facility: CLINIC | Age: 44
End: 2024-02-13

## 2024-04-16 ENCOUNTER — APPOINTMENT (OUTPATIENT)
Dept: PAIN MANAGEMENT | Facility: CLINIC | Age: 44
End: 2024-04-16

## 2024-05-06 ENCOUNTER — APPOINTMENT (OUTPATIENT)
Dept: PAIN MANAGEMENT | Facility: CLINIC | Age: 44
End: 2024-05-06
Payer: COMMERCIAL

## 2024-05-06 VITALS — BODY MASS INDEX: 35.26 KG/M2 | HEIGHT: 63 IN | WEIGHT: 199 LBS

## 2024-05-06 DIAGNOSIS — G90.523 COMPLEX REGIONAL PAIN SYNDROME I OF LOWER LIMB, BILATERAL: ICD-10-CM

## 2024-05-06 PROCEDURE — 99214 OFFICE O/P EST MOD 30 MIN: CPT

## 2024-05-06 RX ORDER — PREGABALIN 75 MG/1
75 CAPSULE ORAL
Qty: 90 | Refills: 2 | Status: DISCONTINUED | COMMUNITY
Start: 2023-02-03 | End: 2024-05-06

## 2024-05-06 NOTE — HISTORY OF PRESENT ILLNESS
[Left Leg] : left leg [Gradual] : gradual [8] : 8 [3] : 3 [Dull/Aching] : dull/aching [Sharp] : sharp [Shooting] : shooting [Intermittent] : intermittent [Household chores] : household chores [Meds] : meds [Heat] : heat [Physical therapy] : physical therapy [Nothing helps with pain getting better] : Nothing helps with pain getting better [Standing] : standing [Walking] : walking [] : yes

## 2024-05-07 RX ORDER — DIAZEPAM 10 MG/1
10 TABLET ORAL
Qty: 1 | Refills: 0 | Status: ACTIVE | COMMUNITY
Start: 2024-05-07 | End: 1900-01-01

## 2024-05-17 ENCOUNTER — APPOINTMENT (OUTPATIENT)
Dept: PAIN MANAGEMENT | Facility: CLINIC | Age: 44
End: 2024-05-17
Payer: COMMERCIAL

## 2024-05-17 PROCEDURE — 64483 NJX AA&/STRD TFRM EPI L/S 1: CPT | Mod: LT

## 2024-05-17 NOTE — PROCEDURE
[FreeTextEntry3] : Date of Service: 05/17/2024   Account: [id number]  Patient: ROBERTO CARLOS CARCAMO   YOB: 1980  Age: 43 year   Surgeon:      Jewel Davison DO  Assistant:    None  Pre-Operative Diagnosis:         Lumbosacral Radiculopathy                   Post Operative Diagnosis:       Lumbosacral Radiculopathy                 Procedure:             Bilateral L5-S1 transforaminal epidural steroid injection under fluoroscopic guidance.  Anesthesia: MAC  This procedure was carried out using fluoroscopic guidance.  The risks and benefits of the procedure were discussed extensively with the patient.  The consent of the patient was obtained and the following procedure was performed.  A timeout was performed with all essential staff present and the site and side were verified.  The right L5-S1 neural foramen was then identified on right oblique "mary dog" anatomical view at the 6 o' clock position using fluoroscopic guidance, and the area was marked. The overlying skin and subcutaneous structures were anesthetized using sterile technique with 1% Lidocaine.   A 22-gauge 5-inch spinal needle was directed toward the inferior (6 o'clock) position of the pedicle, which formed the roof of the identified foramen.  Once in the epidural space, after negative aspiration for heme and CSF, 1cc of Omnipaque contrast was injected to confirm epidural location and assess filling defects and rule out intravascular needle placement.   Lumbar epidurogram showed no intravascular or intrathecal flow pattern.  No blood or CSF was aspirated.  Omnipaque spread medially in epidural space and outlined the exiting nerve root.  After this, 2.5 cc of a mixture of 3 cc of preservative free normal saline plus 80 mg of Kenalog was injected in the epidural space.  Then attention was focused to the left L5-S1 neural foramen. This was then identified on left oblique "mary dog" anatomical view at the 6 o' clock position using fluoroscopic guidance, and the area was marked.  The overlying skin and subcutaneous structures were anesthetized using sterile technique with 1% Lidocaine.  A 22-gauge 5-inch spinal needle was directed toward the inferior (6 o'clock) position of the pedicle, which formed the roof of the identified foramen.  Once in the epidural space, after negative aspiration for heme and CSF, 1cc of Omnipaque contrast was injected to confirm epidural location and assess filling defects and rule out intravascular needle placement.   The following contrast flow observed: no intravascular or intrathecal flow pattern was noted.  No blood or CSF was aspirated. Omnipaque spread medially in epidural space.    After this, the remainder of the injectate listed above was injected in the epidural space.  Vital signs remained normal throughout the procedure.  The patient tolerated the procedure well.  There were no immediate complications from the performed procedure.  The patient was instructed to apply ice over the injection sites for twenty minutes every two hours for the next 24 hours.  Disposition:      1.  The patient was advised to F/U in 1-2 weeks to assess the response to the injection.      2.  The patient was also instructed to contact me immediately if there were any concerns related to the procedure performed.

## 2024-06-15 ENCOUNTER — APPOINTMENT (OUTPATIENT)
Dept: PAIN MANAGEMENT | Facility: CLINIC | Age: 44
End: 2024-06-15
Payer: COMMERCIAL

## 2024-06-15 VITALS — BODY MASS INDEX: 27.82 KG/M2 | WEIGHT: 157 LBS | HEIGHT: 63 IN

## 2024-06-15 DIAGNOSIS — G89.29 PAIN IN LEFT HIP: ICD-10-CM

## 2024-06-15 DIAGNOSIS — M79.2 NEURALGIA AND NEURITIS, UNSPECIFIED: ICD-10-CM

## 2024-06-15 DIAGNOSIS — M25.552 PAIN IN LEFT HIP: ICD-10-CM

## 2024-06-15 DIAGNOSIS — M54.16 RADICULOPATHY, LUMBAR REGION: ICD-10-CM

## 2024-06-15 PROCEDURE — 99213 OFFICE O/P EST LOW 20 MIN: CPT

## 2024-06-15 NOTE — PHYSICAL EXAM
[de-identified] : Constitutional: - No acute distress - Well developed; well nourished  Neurological: - normal mood and affect - alert and oriented x 3  Cardiovascular: - grossly normal  Lumbar Spine Exam:  Inspection: erythema (-) ecchymosis (-) rashes (-) alignment: no scoliosis  Palpation: Midline lumbar tenderness:             (-) midline thoracic tenderness:          (-) Lumbar paraspinal tenderness:      L (-) ; R (-) thoracic paraspinal tenderness:    L (-) ; R (-) sciatic nerve tenderness :             L (-) ; R (-) SI joint tenderness:                        L (-) ; R (-) GTB tenderness:                            L (+);  R (-)  ROM: WNL  Strength:                                    Right       Left    Hip Flexion:                (5/5)       (5/5) Quadriceps:               (5/5)       (5/5) Hamstrings:                (5/5)       (5/5) Ankle Dorsiflexion:     (5/5)       (5/5) EHL:                           (5/5)       (5/5) Ankle Plantarflexion:  (5/5)       (5/5)  Special Tests: SLR:                           R (-) ; L (-) Facet loading:            R (-) ; L (-) LAWRENCE test:               R (-) ; L (-) Hamstring tightness:  R (-);  L (-)  Neurologic: SI LT throughout right lower extremity SI LT throughout left lower extremity  Reflexes normal and symmetric bilateral lower extremities  Gait: non- antalgic gait ambulates without assistive device

## 2024-06-15 NOTE — ASSESSMENT
[FreeTextEntry1] : A discussion regarding available pain management treatment options occurred with the patient. These included interventional, rehabilitative, pharmacological, and alternative modalities. We will proceed with the following:  Interventional treatment options: - None indicated at present time - Can consider repeat bilateral L5-S1 TFESI (80 mg Kenalog) with return of severe low back/bilateral LE radicular pain - Counseled patient regarding limitations of corticosteroid administration and that interventions should be reserved for episodes of severe pain exacerbations; she verbalized an understanding - see additional instructions below  Neuromodulation treatment options: - Previously discussed neuromodulation options for her chronic, persistent neuropathic pain secondary to complex regional pain syndrome; informational materials provided - At this point she defers consideration  Rehabilitative options: - continue physical/aqua therapy as per orthopedic surgery - participation in active HEP was discussed and encouraged as tolerated -Home exercise sheet provided  Medication based treatment options: - Patient prefers to avoid medication-based therapies overall - Has failed trials of Cymbalta and Lyrica - Poor candidate for oral NSAIDs secondary to prior bariatric surgery - continue topical OTC analgesics as needed - see additional instructions below  Complementary treatment options: - Weight management and lifestyle modifications discussed - Continue vitamin supplementation (currently vitamin C, magnesium, and turmeric) - Online resources provided at prior visit for CRPS  Psychological treatment options: - Patient will continue follow-up with psychiatrist as directed - Continue biofeedback and behavior modification with treating psychiatrist  Additional treatment recommendations as follows: - Follow-up in 3 months or as needed basis  The documentation recorded by the scribe, in my presence, accurately reflects the service I personally performed and the decisions made by me with my edits as appropriate.  I, Clayton Bolton acting as scribe, attest that this documentation has been prepared under the direction and in the presence of Provider Jewel Davison DO.

## 2024-06-15 NOTE — HISTORY OF PRESENT ILLNESS
[Lower back] : lower back [4] : 4 [3] : 3 [Dull/Aching] : dull/aching [Radiating] : radiating [Sharp] : sharp [Shooting] : shooting [Throbbing] : throbbing [Tightness] : tightness [Constant] : constant [Household chores] : household chores [Leisure] : leisure [Sleep] : sleep [Meds] : meds [Heat] : heat [Physical therapy] : physical therapy [Injection therapy] : injection therapy [Sitting] : sitting [FreeTextEntry1] : 6/15/2024 - Patient presents for FUV after a bilateral L5-S1 TFESI on 2024.  Patient reports overall 80% reduction of her pain.  Able to perform ADLs and exercise with marked improvement.  Patient continues to overall progress with regards to weight loss efforts.  Has lost almost 45 pounds.  Still does get occasional focal left groin pain as well as "clicking".  2024 - Patient presents for FUV after a bilateral L5-S1 TFESI on 2024.  Patient reports a marked reduction of her symptoms (> 90%) for a period of 2-3 months after the procedure.  Patient reports that she was able to discontinue medication usage as well as participate in exercise; was able to lose 30 pounds.  She states that as of 3 weeks ago her pain started to return; her pain is across the lower back with radiation bilaterally to the hips, prolonged sitting will increase her pain but a fetal seated position provides a longer sitting tolerance.  2024 - Patient presents for FUV after a L5-S1 LESI on 2023. Patient reports a significant reduction of pain s/p epidural (65% overall reduction of pain), her radicular left leg pain has resolved, but she still has paresthesias in the right leg and groin pain; she has discontinued Lyrica.  She continues to undergo orthopedic workup for a left hip labral tear.  Has been indicated for surgery with Dr. Israel and is currently weighing her options.  2023- Patient presents for FUV regarding her low back and left leg pain.  Since her last visit she has undergone further evaluation of the left hip.  She obtained significant reduction of symptoms particular her groin pain following a left intra-articular hip joint injection, however no significant relief following a left GTB injection.  EMG/NCV study which revealed evidence of potential lumbosacral origin for her left hip and distal left lower extremity paresthesias.  Patient states that the paresthesias at the left lateral ankle have intensified she has; discontinued both Lyrica and Cymbalta.  10/5/2023 - Patient presents for FUV regarding their foot and ankle pain (L>R). Patient reports some change in her overall pain since the last visit; pain has become more focused to the soles and medial aspect of her feet.   She reports full body tightness while laying down, and dysesthesias in her feet, worse when wet. She goes to PT and Aqua therapy for pain management; she continues to take 75 mg of Lyrica BID but states the effects conflict with an existing medication (Concerta for binge eating), she plans to decrease daily dosage of Lyrica.  Of note she is working with orthopedic surgery regarding left shoulder and left hip pains.  2023 - Patient presents for FUV regarding their bilateral foot and ankle pain attributed to CRPS. Patient has been managing her pain well until about a week ago, when she started to experience a cold sensation down both lower extremities, along with allodynia in both feet and phantom sensations in both feet as well. She continues to take 75 mg Lyrica twice a day, as well as Cymbalta for pain management; Patient has been participating in PT with meaningful benefit. Of note, she also complains of left sided shoulder and elbow pain, with cold sensations in the forearm.  2023- Patient presents for FUV regarding their foot and ankle pain. Patient has started taking prescribed Lyrica 75 mg TID, with meaningful benefit, she is able to walk for longer periods of time without pain, however she still has sharp pains in the dorsal aspect of her feet, pain will alternate in the right and left foot; she also reports subjective temperature variable throughout her foot and ankle.  Cymbalta was increased to 90 mg by her psychiatrist.  Is also being evaluated by her rheumatologist and there is potential for Raynaud's which may be contributing to her foot symptoms.  Unrelated, she complains of neck pain with radiation to the left upper extremity.  Currently seeing her chiropractor for the last several weeks with some mild benefit.  She reports a history of cervical issues which resolved with conservative care about 5-6 years ago.  She denies any upper extremity weakness, bladder/bowel dysfunction, or saddle numbness.  2023 - Patient presents for reevaluation regarding her bilateral foot and ankle pain.  She was previously a patient of Dr. Toledo.   Patient had a bilateral Kidner procedure done, right side was done first (22) with decent relief, but her dysesthesias stated after the left Kidner procedure was performed (22); left sided symptoms onset before the right.    Patient reports painful dysesthesias bilaterally in her feet (L>R), the sensation radiates up both legs and stops right below the knee; majority of the symptoms are felt bilaterally the front of the shin and it radiates down to the medial aspect of both feet. Patient had been taking Lyrica prescribed by Dr. Toledo with benefit; this was reinforced when she had discontinued for several weeks and her symptoms worsened.  She reports associated color and temperature variation from side to contralateral side as well as ongoing edema.  Currently participating physical therapy and aqua therapy with some benefit.  Unable to use cold as it exacerbates her symptoms.  [facility] (From initial visit): 1) Continuing pain disproportionate to inciting cause.  2) Must report at least one symptom in three of the four following categories: - Sensory: Y - Vasomotor: Y - Sudomotor/Edema: Y - Motor/Trophic: N  3) Must display at least one sign at the time of evaluation in two or more of the following categories: - Sensory: Y - Vasomotor: Y - Sudomotor/Edema: Y - Motor/Trophic: N  4) There is no other diagnosis which better explains the signs and symptoms  Injections: 1) L5-S1 LESI (2023, 2024)  Pertinent Surgical History: 1) left Kidner Procedure (2022) 2) right Kidner procedure (2022)  Imagin) MRI Left Foot (2023) - [facility]  2) MRI lumbar spine (2023) - [facility]  Electrodiagnostic Studies: EMG/NCV- 2023 1. Bilateral, acute and chronic S1>L5 radiculopathy. 2. There is no electrophysiologic evidence for large fiber polyneuropathy at this time.  Physician Disclaimer: I have personally reviewed and confirmed all HPI data with the patient. [] : no [FreeTextEntry7] : groin, b/l hips [FreeTextEntry8] : Driving  [de-identified] : For a long period of time [de-identified] : XRAY

## 2024-08-16 ENCOUNTER — APPOINTMENT (OUTPATIENT)
Dept: ORTHOPEDIC SURGERY | Facility: CLINIC | Age: 44
End: 2024-08-16
Payer: COMMERCIAL

## 2024-08-16 DIAGNOSIS — Q74.2 OTHER CONGENITAL MALFORMATIONS OF LOWER LIMB(S), INCLUDING PELVIC GIRDLE: ICD-10-CM

## 2024-08-16 DIAGNOSIS — M77.42 METATARSALGIA, LEFT FOOT: ICD-10-CM

## 2024-08-16 DIAGNOSIS — G90.523 COMPLEX REGIONAL PAIN SYNDROME I OF LOWER LIMB, BILATERAL: ICD-10-CM

## 2024-08-16 DIAGNOSIS — Q79.60 EHLERS-DANLOS SYNDROME, UNSP: ICD-10-CM

## 2024-08-16 PROCEDURE — 73630 X-RAY EXAM OF FOOT: CPT | Mod: LT

## 2024-08-16 PROCEDURE — 99213 OFFICE O/P EST LOW 20 MIN: CPT

## 2024-08-16 NOTE — DISCUSSION/SUMMARY
[de-identified] : Met pads discussed pain management follow-up custom orthotics with metatarsal relief recommended

## 2024-08-16 NOTE — PHYSICAL EXAM
[5___] : left extensor hallicus longus 5[unfilled]/5 [Left] : left foot and ankle [2+] : dorsalis pedis pulse: 2+ [3rd] : 3rd [4th] : 4th [5th] : 5th [FreeTextEntry8] : postero-lateral thigh [] : no plantar fascia tenderness [de-identified] : cold feeling to toes, hypersensitive to lateral leg up  to knee [TWNoteComboBox7] : dorsiflexion 10 degrees

## 2024-08-16 NOTE — HISTORY OF PRESENT ILLNESS
[Radiating] : radiating [Sharp] : sharp [Shooting] : shooting [Tingling] : tingling [Intermittent] : intermittent [Leisure] : leisure [Meds] : meds [Physical therapy] : physical therapy [Standing] : standing [Walking] : walking [Full time] : Work status: full time [de-identified] : 5/5/22: Pt is a 41 year old F who presents today for evaluation of their right foot. Pt states that Pain localized along the medial lateral dorsal plantar. Was seen at  and xrays done  6/8/22: Here for a second opinion for right foot/ankle pain. Pain started 4/29/22 after running. She was treated in a cam boot for 4 days. She saw Dr. Santos and was treated in airft brace without relief. Pain started in her heel but is now in the inside of her ankle. She also wears orthotics. She had an mri done at [facility].   6/27/22  R Kidner procedure  7/6/22  f/u R leg, nwb in splint  Xarelto pending 8/3/22 f/u R leg nwb in slc 9/7/22  f/u R foot  HEP/PT   Considering L foot sugery 11/2022 10/19/22  Pre-op L leg  11/4/22 L Kidner  11/9/22 f/u L leg, nwb in splint  Xarelto 12/14/22  f/u L leg nwb in slc 1/11/23 f/u L foot.  WB in cast w/arch support  Started PT  Concerned about plantar fascia 2/1/23 f/u L foot  random pain and swelling and disloration  Improves with elevation.  Also w/ 1 mtp pain 2/22/23: f/u L foot. comfortable in sneakers. PT helpful . Seeing pain management. Recently started on Lyrica with some relief. Swelling/color changes better w/compression stockinjgs 4/12/23  f/u L ankle  HEP/PT  back on Lyrica 50 tid (had rebound) 5/24/23 f/u L and R feet.  PT/Pain mgt  Compression socks 20-30.  Lyrica 75 tid and Cymbalta 60 mg 7/5/23:  f/u L ankle. Seeing Rheumatologist. Orthotics. PT 8/9/23  f/u both feet  HEP/PT  Pain mgt  Lyrica 75 tid and Cymbalta 90 9/20/23  f/u both feet.  water temp sensitive  PT [facility] orthotics 11/1/23  f/u mireille feet.  Still reports tingling to legs L > R.  Lyrica 75 daily  using insoles 08/16/2024: f/u b/l feet. Pt states pain falred up 3 weeks ago for left foot  pain along the plantar lateral foot. No trauma. Also, b/l toe pain. Right foot pain and spasms/snapping to the right side. Notes changes in color to both feet.  Doing CBT therapy and biofeedback. Pain mgt  Using insoles [] : Post Surgical Visit: no [FreeTextEntry1] : left foot worse than the right  [FreeTextEntry5] : hip pain is a an 8 and foot pain is a 2 [FreeTextEntry6] : "cold" pain near incision, cramping, sensitive [FreeTextEntry9] : compression socks, HEP [de-identified] : cold weather,sensitive to cold water [de-identified] : 11-4-22 [de-identified] : EMG [facility] [de-identified] :  [de-identified] : from home [de-identified] : 11-4-22

## 2024-08-16 NOTE — IMAGING
[Left] : left foot [Weight -] : weightbearing [No loss of surgical correlation. Bony alignment acceptable. Hardware in appropriate position] : No loss of surgical correlation. Bony alignment acceptable. Hardware in appropriate position [de-identified] : No visible fractures.

## 2024-12-24 ENCOUNTER — APPOINTMENT (OUTPATIENT)
Dept: PAIN MANAGEMENT | Facility: CLINIC | Age: 44
End: 2024-12-24
Payer: COMMERCIAL

## 2024-12-24 VITALS — WEIGHT: 132 LBS | BODY MASS INDEX: 23.39 KG/M2 | HEIGHT: 63 IN

## 2024-12-24 DIAGNOSIS — M54.16 RADICULOPATHY, LUMBAR REGION: ICD-10-CM

## 2024-12-24 DIAGNOSIS — M51.26 OTHER INTERVERTEBRAL DISC DISPLACEMENT, LUMBAR REGION: ICD-10-CM

## 2024-12-24 PROCEDURE — 99214 OFFICE O/P EST MOD 30 MIN: CPT

## 2025-01-29 ENCOUNTER — APPOINTMENT (OUTPATIENT)
Dept: PAIN MANAGEMENT | Facility: CLINIC | Age: 45
End: 2025-01-29
Payer: COMMERCIAL

## 2025-01-29 DIAGNOSIS — M54.16 RADICULOPATHY, LUMBAR REGION: ICD-10-CM

## 2025-01-29 PROCEDURE — 64483 NJX AA&/STRD TFRM EPI L/S 1: CPT | Mod: LT

## 2025-02-18 ENCOUNTER — APPOINTMENT (OUTPATIENT)
Dept: PAIN MANAGEMENT | Facility: CLINIC | Age: 45
End: 2025-02-18
Payer: COMMERCIAL

## 2025-02-18 VITALS — BODY MASS INDEX: 23.92 KG/M2 | WEIGHT: 130 LBS | HEIGHT: 62 IN

## 2025-02-18 DIAGNOSIS — M54.16 RADICULOPATHY, LUMBAR REGION: ICD-10-CM

## 2025-02-18 DIAGNOSIS — M51.26 OTHER INTERVERTEBRAL DISC DISPLACEMENT, LUMBAR REGION: ICD-10-CM

## 2025-02-18 PROCEDURE — 99214 OFFICE O/P EST MOD 30 MIN: CPT

## 2025-02-22 ENCOUNTER — RESULT REVIEW (OUTPATIENT)
Age: 45
End: 2025-02-22

## 2025-02-24 ENCOUNTER — NON-APPOINTMENT (OUTPATIENT)
Age: 45
End: 2025-02-24

## 2025-03-06 ENCOUNTER — APPOINTMENT (OUTPATIENT)
Dept: PAIN MANAGEMENT | Facility: CLINIC | Age: 45
End: 2025-03-06
Payer: COMMERCIAL

## 2025-03-06 VITALS — HEIGHT: 62 IN | WEIGHT: 125 LBS | BODY MASS INDEX: 23 KG/M2

## 2025-03-06 DIAGNOSIS — M54.16 RADICULOPATHY, LUMBAR REGION: ICD-10-CM

## 2025-03-06 DIAGNOSIS — G90.523 COMPLEX REGIONAL PAIN SYNDROME I OF LOWER LIMB, BILATERAL: ICD-10-CM

## 2025-03-06 DIAGNOSIS — M51.26 OTHER INTERVERTEBRAL DISC DISPLACEMENT, LUMBAR REGION: ICD-10-CM

## 2025-03-06 PROCEDURE — 99214 OFFICE O/P EST MOD 30 MIN: CPT

## 2025-08-21 ENCOUNTER — TRANSCRIPTION ENCOUNTER (OUTPATIENT)
Age: 45
End: 2025-08-21

## 2025-08-25 ENCOUNTER — APPOINTMENT (OUTPATIENT)
Dept: PAIN MANAGEMENT | Facility: CLINIC | Age: 45
End: 2025-08-25
Payer: COMMERCIAL

## 2025-08-25 VITALS — WEIGHT: 128 LBS | HEIGHT: 62 IN | BODY MASS INDEX: 23.55 KG/M2

## 2025-08-25 DIAGNOSIS — M51.26 OTHER INTERVERTEBRAL DISC DISPLACEMENT, LUMBAR REGION: ICD-10-CM

## 2025-08-25 DIAGNOSIS — M54.16 RADICULOPATHY, LUMBAR REGION: ICD-10-CM

## 2025-08-25 DIAGNOSIS — G90.523 COMPLEX REGIONAL PAIN SYNDROME I OF LOWER LIMB, BILATERAL: ICD-10-CM

## 2025-08-25 PROCEDURE — 99214 OFFICE O/P EST MOD 30 MIN: CPT

## 2025-09-10 ENCOUNTER — APPOINTMENT (OUTPATIENT)
Dept: PAIN MANAGEMENT | Facility: CLINIC | Age: 45
End: 2025-09-10
Payer: COMMERCIAL

## 2025-09-10 DIAGNOSIS — M54.16 RADICULOPATHY, LUMBAR REGION: ICD-10-CM

## 2025-09-10 PROCEDURE — 64483 NJX AA&/STRD TFRM EPI L/S 1: CPT | Mod: RT
